# Patient Record
Sex: FEMALE | Race: WHITE | Employment: PART TIME | URBAN - METROPOLITAN AREA
[De-identification: names, ages, dates, MRNs, and addresses within clinical notes are randomized per-mention and may not be internally consistent; named-entity substitution may affect disease eponyms.]

---

## 2017-01-24 ENCOUNTER — LAB ENCOUNTER (OUTPATIENT)
Dept: LAB | Age: 42
End: 2017-01-24
Attending: INTERNAL MEDICINE
Payer: COMMERCIAL

## 2017-01-24 DIAGNOSIS — E55.9 VITAMIN D DEFICIENCY: ICD-10-CM

## 2017-01-24 DIAGNOSIS — E78.2 MIXED HYPERLIPIDEMIA: ICD-10-CM

## 2017-01-24 DIAGNOSIS — E53.8 LOW VITAMIN B12 LEVEL: ICD-10-CM

## 2017-01-24 DIAGNOSIS — R79.89 ABNORMAL THYROID BLOOD TEST: ICD-10-CM

## 2017-01-24 DIAGNOSIS — Z86.39 HISTORY OF IRON DEFICIENCY: ICD-10-CM

## 2017-01-24 LAB
25-HYDROXYVITAMIN D (TOTAL): 18.4 NG/ML (ref 30–100)
ALBUMIN SERPL-MCNC: 4 G/DL (ref 3.5–4.8)
ALP LIVER SERPL-CCNC: 54 U/L (ref 37–98)
ALT SERPL-CCNC: 26 U/L (ref 14–54)
AST SERPL-CCNC: 11 U/L (ref 15–41)
BASOPHILS # BLD AUTO: 0.02 X10(3) UL (ref 0–0.1)
BASOPHILS NFR BLD AUTO: 0.4 %
BILIRUB SERPL-MCNC: 0.5 MG/DL (ref 0.1–2)
BUN BLD-MCNC: 12 MG/DL (ref 8–20)
CALCIUM BLD-MCNC: 9.3 MG/DL (ref 8.3–10.3)
CHLORIDE: 105 MMOL/L (ref 101–111)
CHOLEST SMN-MCNC: 216 MG/DL (ref ?–200)
CO2: 29 MMOL/L (ref 22–32)
CREAT BLD-MCNC: 0.76 MG/DL (ref 0.55–1.02)
EOSINOPHIL # BLD AUTO: 0.07 X10(3) UL (ref 0–0.3)
EOSINOPHIL NFR BLD AUTO: 1.4 %
ERYTHROCYTE [DISTWIDTH] IN BLOOD BY AUTOMATED COUNT: 13 % (ref 11.5–16)
GLUCOSE BLD-MCNC: 86 MG/DL (ref 70–99)
HAV AB SERPL IA-ACNC: 358 PG/ML (ref 193–986)
HCT VFR BLD AUTO: 40.4 % (ref 34–50)
HDLC SERPL-MCNC: 75 MG/DL (ref 45–?)
HDLC SERPL: 2.88 {RATIO} (ref ?–4.44)
HGB BLD-MCNC: 13.1 G/DL (ref 12–16)
IMMATURE GRANULOCYTE COUNT: 0.01 X10(3) UL (ref 0–1)
IMMATURE GRANULOCYTE RATIO %: 0.2 %
LDLC SERPL CALC-MCNC: 126 MG/DL (ref ?–130)
LYMPHOCYTES # BLD AUTO: 1.3 X10(3) UL (ref 0.9–4)
LYMPHOCYTES NFR BLD AUTO: 26 %
M PROTEIN MFR SERPL ELPH: 7.2 G/DL (ref 6.1–8.3)
MCH RBC QN AUTO: 28.9 PG (ref 27–33.2)
MCHC RBC AUTO-ENTMCNC: 32.4 G/DL (ref 31–37)
MCV RBC AUTO: 89 FL (ref 81–100)
MONOCYTES # BLD AUTO: 0.33 X10(3) UL (ref 0.1–0.6)
MONOCYTES NFR BLD AUTO: 6.6 %
NEUTROPHIL ABS PRELIM: 3.27 X10 (3) UL (ref 1.3–6.7)
NEUTROPHILS # BLD AUTO: 3.27 X10(3) UL (ref 1.3–6.7)
NEUTROPHILS NFR BLD AUTO: 65.4 %
NONHDLC SERPL-MCNC: 141 MG/DL (ref ?–130)
PLATELET # BLD AUTO: 245 10(3)UL (ref 150–450)
POTASSIUM SERPL-SCNC: 4.1 MMOL/L (ref 3.6–5.1)
RBC # BLD AUTO: 4.54 X10(6)UL (ref 3.8–5.1)
RED CELL DISTRIBUTION WIDTH-SD: 42.2 FL (ref 35.1–46.3)
SODIUM SERPL-SCNC: 139 MMOL/L (ref 136–144)
TRIGLYCERIDES: 75 MG/DL (ref ?–150)
TSI SER-ACNC: 1.36 MIU/ML (ref 0.35–5.5)
VLDL: 15 MG/DL (ref 5–40)
WBC # BLD AUTO: 5 X10(3) UL (ref 4–13)

## 2017-01-24 PROCEDURE — 80061 LIPID PANEL: CPT

## 2017-01-24 PROCEDURE — 85025 COMPLETE CBC W/AUTO DIFF WBC: CPT

## 2017-01-24 PROCEDURE — 80053 COMPREHEN METABOLIC PANEL: CPT

## 2017-01-24 PROCEDURE — 84443 ASSAY THYROID STIM HORMONE: CPT

## 2017-01-24 PROCEDURE — 82306 VITAMIN D 25 HYDROXY: CPT

## 2017-01-24 PROCEDURE — 36415 COLL VENOUS BLD VENIPUNCTURE: CPT

## 2017-01-24 PROCEDURE — 82607 VITAMIN B-12: CPT

## 2017-02-01 ENCOUNTER — PATIENT MESSAGE (OUTPATIENT)
Dept: FAMILY MEDICINE CLINIC | Facility: CLINIC | Age: 42
End: 2017-02-01

## 2017-02-01 NOTE — TELEPHONE ENCOUNTER
Patient is able to see results drawn 1/24/17 on [facility] and is asking for vitamin D replacement. Results have not been reviewed yet, would you be able to review and advise when able?  Thank you!!!

## 2017-02-01 NOTE — TELEPHONE ENCOUNTER
From: Thor Frost  To: Radha Dominguez MD  Sent: 2/1/2017 2:29 PM CST  Subject: Prescription Question    Will Dr call me in vitamin D? Dixon at 60/80.   Thanks

## 2017-02-02 RX ORDER — ERGOCALCIFEROL 1.25 MG/1
50000 CAPSULE ORAL WEEKLY
Qty: 12 CAPSULE | Refills: 0 | Status: SHIPPED | OUTPATIENT
Start: 2017-02-02 | End: 2017-04-21

## 2017-02-02 NOTE — TELEPHONE ENCOUNTER
OK for usual vitamin D protocol   Low vitamin D level  Start 50,000 U q week x 12 weeks  Then start daily maintenance vitamin D 2000 Units

## 2017-02-20 ENCOUNTER — TELEPHONE (OUTPATIENT)
Dept: FAMILY MEDICINE CLINIC | Facility: CLINIC | Age: 42
End: 2017-02-20

## 2017-02-20 NOTE — TELEPHONE ENCOUNTER
Patient returned call about her lab results. She did pickup Vitamin D - it was sent to her pharmacy. She will get B12 shots, but wants to call back to schedule them.

## 2017-03-01 RX ORDER — CYANOCOBALAMIN 1000 UG/ML
1000 INJECTION INTRAMUSCULAR; SUBCUTANEOUS
Status: SHIPPED | OUTPATIENT
Start: 2017-03-01 | End: 2017-08-28

## 2017-03-01 NOTE — PROGRESS NOTES
Notes Recorded by Sukh Blanco MD on 2/5/2017 at 12:24 PM  tsh normal  Low b12, recommend starting b12 injections x 6 mo  cmp stable   Lipid panel stable  Low vitamin D level  Start 50,000 U q week x 12 weeks  Then start daily maintenance vitamin D 2000 Uni

## 2017-03-02 ENCOUNTER — NURSE ONLY (OUTPATIENT)
Dept: FAMILY MEDICINE CLINIC | Facility: CLINIC | Age: 42
End: 2017-03-02

## 2017-03-06 ENCOUNTER — NURSE ONLY (OUTPATIENT)
Dept: FAMILY MEDICINE CLINIC | Facility: CLINIC | Age: 42
End: 2017-03-06

## 2017-03-06 DIAGNOSIS — E53.8 LOW VITAMIN B12 LEVEL: Primary | ICD-10-CM

## 2017-03-06 PROCEDURE — 96372 THER/PROPH/DIAG INJ SC/IM: CPT | Performed by: INTERNAL MEDICINE

## 2017-03-06 RX ADMIN — CYANOCOBALAMIN 1000 MCG: 1000 INJECTION INTRAMUSCULAR; SUBCUTANEOUS at 14:06:00

## 2017-03-06 NOTE — PROGRESS NOTES
Notes Recorded by Namrata Romero MD on 2/5/2017 at 12:24 PM  tsh normal  Low b12, recommend starting b12 injections x 6 mo  cmp stable   Lipid panel stable  Low vitamin D level  Start 50,000 U q week x 12 weeks  Then start daily maintenance vitamin D 2000 Uni

## 2017-05-16 ENCOUNTER — NURSE ONLY (OUTPATIENT)
Dept: FAMILY MEDICINE CLINIC | Facility: CLINIC | Age: 42
End: 2017-05-16

## 2017-05-16 DIAGNOSIS — E53.8 LOW VITAMIN B12 LEVEL: Primary | ICD-10-CM

## 2017-05-16 PROCEDURE — 96372 THER/PROPH/DIAG INJ SC/IM: CPT | Performed by: INTERNAL MEDICINE

## 2017-05-16 RX ADMIN — CYANOCOBALAMIN 1000 MCG: 1000 INJECTION INTRAMUSCULAR; SUBCUTANEOUS at 13:33:00

## 2017-05-16 NOTE — PROGRESS NOTES
Patient is here for B12 injection. Given IM left deltoid. Patient tolerated well. All questions answered.

## 2017-05-25 ENCOUNTER — HOSPITAL ENCOUNTER (OUTPATIENT)
Dept: MAMMOGRAPHY | Age: 42
Discharge: HOME | End: 2017-05-25
Attending: INTERNAL MEDICINE
Payer: COMMERCIAL

## 2017-05-25 DIAGNOSIS — Z12.31 VISIT FOR SCREENING MAMMOGRAM: ICD-10-CM

## 2017-05-25 PROCEDURE — 77067 SCR MAMMO BI INCL CAD: CPT | Performed by: INTERNAL MEDICINE

## 2017-05-26 DIAGNOSIS — Z12.31 ENCOUNTER FOR SCREENING MAMMOGRAM FOR BREAST CANCER: Primary | ICD-10-CM

## 2017-09-01 ENCOUNTER — MED REC SCAN ONLY (OUTPATIENT)
Dept: FAMILY MEDICINE CLINIC | Facility: CLINIC | Age: 42
End: 2017-09-01

## 2017-09-19 ENCOUNTER — NURSE ONLY (OUTPATIENT)
Dept: FAMILY MEDICINE CLINIC | Facility: CLINIC | Age: 42
End: 2017-09-19

## 2017-09-19 DIAGNOSIS — Z23 NEED FOR INFLUENZA VACCINATION: Primary | ICD-10-CM

## 2017-09-19 PROCEDURE — 96372 THER/PROPH/DIAG INJ SC/IM: CPT | Performed by: INTERNAL MEDICINE

## 2017-09-19 PROCEDURE — 90471 IMMUNIZATION ADMIN: CPT | Performed by: INTERNAL MEDICINE

## 2017-09-19 PROCEDURE — 90686 IIV4 VACC NO PRSV 0.5 ML IM: CPT | Performed by: INTERNAL MEDICINE

## 2017-09-19 RX ORDER — CYANOCOBALAMIN 1000 UG/ML
1000 INJECTION INTRAMUSCULAR; SUBCUTANEOUS
Status: SHIPPED | OUTPATIENT
Start: 2017-09-19 | End: 2018-01-17

## 2017-09-19 RX ADMIN — CYANOCOBALAMIN 1000 MCG: 1000 INJECTION INTRAMUSCULAR; SUBCUTANEOUS at 10:16:00

## 2017-09-19 NOTE — PROGRESS NOTES
Patient is here for vitamin B12 injection #3 and Flu vaccine. Flu given IM to left deltoid and B12 given IM to right deltoid - both without incident.

## 2017-09-19 NOTE — PROGRESS NOTES
Notes Recorded by Thelma Panda MD on 2/5/2017 at 12:24 PM  tsh normal  Low b12, recommend starting b12 injections x 6 mo  cmp stable   Lipid panel stable  Low vitamin D level  Start 50,000 U q week x 12 weeks  Then start daily maintenance vitamin D 2000 Uni

## 2017-09-25 ENCOUNTER — OFFICE VISIT (OUTPATIENT)
Dept: FAMILY MEDICINE CLINIC | Facility: CLINIC | Age: 42
End: 2017-09-25

## 2017-09-25 ENCOUNTER — APPOINTMENT (OUTPATIENT)
Dept: LAB | Age: 42
End: 2017-09-25
Attending: INTERNAL MEDICINE
Payer: COMMERCIAL

## 2017-09-25 VITALS
HEART RATE: 88 BPM | DIASTOLIC BLOOD PRESSURE: 78 MMHG | WEIGHT: 239 LBS | BODY MASS INDEX: 36.22 KG/M2 | HEIGHT: 68 IN | TEMPERATURE: 99 F | RESPIRATION RATE: 16 BRPM | SYSTOLIC BLOOD PRESSURE: 138 MMHG

## 2017-09-25 DIAGNOSIS — R00.2 HEART PALPITATIONS: Primary | ICD-10-CM

## 2017-09-25 DIAGNOSIS — H81.13 BENIGN PAROXYSMAL POSITIONAL VERTIGO DUE TO BILATERAL VESTIBULAR DISORDER: ICD-10-CM

## 2017-09-25 DIAGNOSIS — R79.89 LOW T4: ICD-10-CM

## 2017-09-25 LAB
FREE T4: 1 NG/DL (ref 0.9–1.8)
TSI SER-ACNC: 1.4 MIU/ML (ref 0.35–5.5)

## 2017-09-25 PROCEDURE — 84439 ASSAY OF FREE THYROXINE: CPT

## 2017-09-25 PROCEDURE — 84443 ASSAY THYROID STIM HORMONE: CPT

## 2017-09-25 PROCEDURE — 99214 OFFICE O/P EST MOD 30 MIN: CPT | Performed by: INTERNAL MEDICINE

## 2017-09-25 PROCEDURE — 36415 COLL VENOUS BLD VENIPUNCTURE: CPT

## 2017-09-25 PROCEDURE — 93000 ELECTROCARDIOGRAM COMPLETE: CPT | Performed by: INTERNAL MEDICINE

## 2017-09-25 RX ORDER — MECLIZINE HCL 12.5 MG/1
12.5 TABLET ORAL 3 TIMES DAILY PRN
Qty: 20 TABLET | Refills: 0 | Status: SHIPPED | OUTPATIENT
Start: 2017-09-25 | End: 2018-01-10 | Stop reason: ALTCHOICE

## 2017-09-25 NOTE — PROGRESS NOTES
CC: Patient presents with:  Dizziness: x 2-3 weeks  Palpitations: x 2 weeks       HPI:     Diagnosed with BPPV a few years ago.    3 weeks ago feels like BPPV is back   Got her hair done and that was difficult with moving back and fwd  BP was 140/90 and 20 tablet 0      Sig: Take 1 tablet (12.5 mg total) by mouth 3 (three) times daily as needed.           ELECTROCARDIOGRAM, COMPLETE  OP REFERRAL TO [facility]  CARD MONITOR HOLTER 48 HOUR (CPT=66704)  CARD ECHO STRESS ECHO/REST AND STRE

## 2017-09-28 ENCOUNTER — HOSPITAL ENCOUNTER (OUTPATIENT)
Dept: CV DIAGNOSTICS | Facility: HOSPITAL | Age: 42
Discharge: HOME | End: 2017-09-28
Attending: INTERNAL MEDICINE
Payer: COMMERCIAL

## 2017-09-28 DIAGNOSIS — R00.2 HEART PALPITATIONS: ICD-10-CM

## 2017-09-28 PROCEDURE — 93227 XTRNL ECG REC<48 HR R&I: CPT | Performed by: INTERNAL MEDICINE

## 2017-09-28 PROCEDURE — 93226 XTRNL ECG REC<48 HR SCAN A/R: CPT | Performed by: INTERNAL MEDICINE

## 2017-09-28 PROCEDURE — 93225 XTRNL ECG REC<48 HRS REC: CPT | Performed by: INTERNAL MEDICINE

## 2017-10-02 DIAGNOSIS — I49.3 PVC (PREMATURE VENTRICULAR CONTRACTION): Primary | ICD-10-CM

## 2017-10-02 DIAGNOSIS — I49.1 PAC (PREMATURE ATRIAL CONTRACTION): ICD-10-CM

## 2017-10-04 ENCOUNTER — TELEPHONE (OUTPATIENT)
Dept: FAMILY MEDICINE CLINIC | Facility: CLINIC | Age: 42
End: 2017-10-04

## 2017-10-04 DIAGNOSIS — I49.3 PVC (PREMATURE VENTRICULAR CONTRACTION): Primary | ICD-10-CM

## 2017-10-04 NOTE — TELEPHONE ENCOUNTER
Sonia Santana - Peer to Peer << Less Detail',event)\" href=\"javascript:;\">More Detail >>      Peer to Peer   Sonia Santana   Sent: Wed October 04, 2017  8:34 AM   To: P [facility] Clinical Staff                  Message     Good Morning,      Per [facility] -----  From: Ester Alonso  Sent: 10/4/2017   8:34 AM  To: [facility] Clinical Staff  Subject: Peer to Peer                                     Good Morning,    Per [facility] this test does not meet medical necessity and has been denied.  Per their guidelines the

## 2017-10-05 PROBLEM — I49.3 PVC (PREMATURE VENTRICULAR CONTRACTION): Status: ACTIVE | Noted: 2017-10-05

## 2017-10-10 NOTE — TELEPHONE ENCOUNTER
Order   CARD ECHO STRESS ECHO/REST AND STRESS (CPT=93351) (Order # [id number]) on 09/25/2017   View Encounter        This test will not be covered. A traditional stress test on a treadmill will be covered. Do you want to change order?     Patient saw car

## 2017-10-10 NOTE — TELEPHONE ENCOUNTER
Order placed for plain treadmill stress test.  I called patient to inform , lm for her to contact scheduling to reschedule plain treadmill test since stress echo is not approved.

## 2017-10-31 ENCOUNTER — HOSPITAL ENCOUNTER (OUTPATIENT)
Dept: CV DIAGNOSTICS | Age: 42
Discharge: HOME | End: 2017-10-31
Attending: INTERNAL MEDICINE
Payer: COMMERCIAL

## 2017-10-31 DIAGNOSIS — I49.3 PVC (PREMATURE VENTRICULAR CONTRACTION): ICD-10-CM

## 2017-10-31 PROCEDURE — 93018 CV STRESS TEST I&R ONLY: CPT | Performed by: INTERNAL MEDICINE

## 2017-10-31 PROCEDURE — 93017 CV STRESS TEST TRACING ONLY: CPT | Performed by: INTERNAL MEDICINE

## 2017-11-02 NOTE — PROGRESS NOTES
Spoke with cardiology at [facility] .They will fax tracings to the [facility] office .

## 2017-11-15 NOTE — TELEPHONE ENCOUNTER
Scheduling called us to discuss Stress Echo ordered - patient is scheduled, but it fails coverage and they want to know if we are going to do peer to peer.    On 10/4/17 Dr. Mary Alice Barry was informed it was not covered and she had us order Treadmill Stress Test and

## 2017-11-16 NOTE — TELEPHONE ENCOUNTER
Patient called back and said that when she saw Dr. Tanesha Melo - he wanted her to proceed with stress echo.   I informed her to call his office to have order placed in his name as he will have to justify with insurance why he wants this test. Patient will melany

## 2017-11-20 ENCOUNTER — HOSPITAL ENCOUNTER (OUTPATIENT)
Dept: CV DIAGNOSTICS | Facility: HOSPITAL | Age: 42
Discharge: HOME | End: 2017-11-20
Attending: INTERNAL MEDICINE
Payer: COMMERCIAL

## 2017-11-20 DIAGNOSIS — R00.2 HEART PALPITATIONS: ICD-10-CM

## 2017-11-20 DIAGNOSIS — R94.39 ABNORMAL STRESS TEST: ICD-10-CM

## 2017-11-20 PROCEDURE — 93017 CV STRESS TEST TRACING ONLY: CPT | Performed by: INTERNAL MEDICINE

## 2017-11-20 PROCEDURE — 93018 CV STRESS TEST I&R ONLY: CPT | Performed by: INTERNAL MEDICINE

## 2017-11-20 PROCEDURE — 93350 STRESS TTE ONLY: CPT | Performed by: INTERNAL MEDICINE

## 2017-11-20 NOTE — IMAGING NOTE
Patient had multiple pacs and pvcs on SE and one 4 beat run of ventricular tachycardia. Patient remained asymptomatic.  Emiliano OGLESBY was notified and patient was ok to go home and told to follow up with her cardiologist.

## 2017-11-21 PROBLEM — I47.29 NON-SUSTAINED VENTRICULAR TACHYCARDIA (HCC): Status: ACTIVE | Noted: 2017-11-21

## 2017-11-21 PROBLEM — I47.2 NON-SUSTAINED VENTRICULAR TACHYCARDIA (HCC): Status: ACTIVE | Noted: 2017-11-21

## 2017-11-21 PROBLEM — I47.29 NON-SUSTAINED VENTRICULAR TACHYCARDIA: Status: ACTIVE | Noted: 2017-11-21

## 2017-11-21 RX ORDER — LEVOTHYROXINE SODIUM 0.05 MG/1
TABLET ORAL
Qty: 30 TABLET | Refills: 5 | Status: SHIPPED | OUTPATIENT
Start: 2017-11-21 | End: 2018-03-19

## 2017-11-21 NOTE — TELEPHONE ENCOUNTER
Requesting Levothyroxine  LOV: 9/25/1  RTC: 4 weeks  Last Relevant Labs: 9/25/17  Filled: 11/7/16 #90 with 3 refills  PP - refilled  Future Appointments  Date Time Provider Alesia Herrera   11/27/2017 1:30 PM [facility] NURSE [facility] [address]       Time:

## 2017-11-21 NOTE — PROGRESS NOTES
Spoke with patient ,results given. Order sent to prior [facility] for event monitor .

## 2017-11-27 ENCOUNTER — NURSE ONLY (OUTPATIENT)
Dept: FAMILY MEDICINE CLINIC | Facility: CLINIC | Age: 42
End: 2017-11-27

## 2017-11-27 PROCEDURE — 96372 THER/PROPH/DIAG INJ SC/IM: CPT | Performed by: INTERNAL MEDICINE

## 2017-11-27 RX ADMIN — CYANOCOBALAMIN 1000 MCG: 1000 INJECTION INTRAMUSCULAR; SUBCUTANEOUS at 13:42:00

## 2017-11-27 NOTE — PROGRESS NOTES
LM for patient to call back. She should not be getting B12 injections still?      Notes Recorded by Thalia Jain MD on 2/5/2017 at 12:24 PM  tsh normal  Low b12, recommend starting b12 injections x 6 mo    No lab since January 2017    Ref Range & Units 1/24

## 2017-11-28 ENCOUNTER — HOSPITAL ENCOUNTER (OUTPATIENT)
Dept: CV DIAGNOSTICS | Age: 42
Discharge: HOME | End: 2017-11-28
Attending: INTERNAL MEDICINE
Payer: COMMERCIAL

## 2017-11-28 DIAGNOSIS — I47.2 NON-SUSTAINED VENTRICULAR TACHYCARDIA (HCC): ICD-10-CM

## 2017-11-28 DIAGNOSIS — I49.3 PVC (PREMATURE VENTRICULAR CONTRACTION): ICD-10-CM

## 2017-11-28 PROCEDURE — 93272 ECG/REVIEW INTERPRET ONLY: CPT | Performed by: INTERNAL MEDICINE

## 2017-11-28 PROCEDURE — 93270 REMOTE 30 DAY ECG REV/REPORT: CPT | Performed by: INTERNAL MEDICINE

## 2017-11-28 PROCEDURE — 93271 ECG/MONITORING AND ANALYSIS: CPT | Performed by: INTERNAL MEDICINE

## 2017-12-11 ENCOUNTER — PATIENT MESSAGE (OUTPATIENT)
Dept: INTERNAL MEDICINE CLINIC | Facility: CLINIC | Age: 42
End: 2017-12-11

## 2017-12-11 NOTE — TELEPHONE ENCOUNTER
From: Jovan Reed  To: Calleen Denver, MD  Sent: 12/11/2017 7:27 AM CST  Subject: Non-Urgent Medical Question    Can you ask Dr Nancy Irizarry to order me a blood draw? It’s been a year and I’m going thru my final test for my heart palpitations (I hope).  Then [employer]

## 2018-01-09 PROBLEM — R94.39 ABNORMAL STRESS TEST: Status: ACTIVE | Noted: 2018-01-09

## 2018-02-15 ENCOUNTER — OFFICE VISIT (OUTPATIENT)
Dept: OBGYN CLINIC | Facility: CLINIC | Age: 43
End: 2018-02-15

## 2018-02-15 VITALS
SYSTOLIC BLOOD PRESSURE: 110 MMHG | BODY MASS INDEX: 37.59 KG/M2 | HEIGHT: 68 IN | RESPIRATION RATE: 18 BRPM | HEART RATE: 88 BPM | DIASTOLIC BLOOD PRESSURE: 60 MMHG | WEIGHT: 248 LBS

## 2018-02-15 DIAGNOSIS — Z01.419 ENCOUNTER FOR WELL WOMAN EXAM WITH ROUTINE GYNECOLOGICAL EXAM: Primary | ICD-10-CM

## 2018-02-15 PROCEDURE — 87624 HPV HI-RISK TYP POOLED RSLT: CPT | Performed by: OBSTETRICS & GYNECOLOGY

## 2018-02-15 PROCEDURE — 99396 PREV VISIT EST AGE 40-64: CPT | Performed by: OBSTETRICS & GYNECOLOGY

## 2018-02-15 PROCEDURE — 88175 CYTOPATH C/V AUTO FLUID REDO: CPT | Performed by: OBSTETRICS & GYNECOLOGY

## 2018-02-15 NOTE — PROGRESS NOTES
Blair Burris is a 36year old female  Patient's last menstrual period was 2018 (exact date).  Patient presents with:  Wellness Visit: annual  .  Patient states that 2nd day of her period is heavy, declines any hormonal b.c., discussed Lysteda 5    ALLERGIES:  No Known Allergies      Review of Systems:  Constitutional:  Denies fatigue, night sweats, hot flashes  Eyes:  denies blurred or double vision  Cardiovascular:  denies chest pain or palpitations  Respiratory:  denies shortness of breath  G

## 2018-02-16 LAB — HPV I/H RISK 1 DNA SPEC QL NAA+PROBE: NEGATIVE

## 2018-03-19 ENCOUNTER — LAB ENCOUNTER (OUTPATIENT)
Dept: LAB | Age: 43
End: 2018-03-19
Attending: FAMILY MEDICINE
Payer: COMMERCIAL

## 2018-03-19 ENCOUNTER — OFFICE VISIT (OUTPATIENT)
Dept: FAMILY MEDICINE CLINIC | Facility: CLINIC | Age: 43
End: 2018-03-19

## 2018-03-19 VITALS
HEART RATE: 72 BPM | HEIGHT: 68 IN | SYSTOLIC BLOOD PRESSURE: 134 MMHG | WEIGHT: 250 LBS | RESPIRATION RATE: 16 BRPM | DIASTOLIC BLOOD PRESSURE: 94 MMHG | BODY MASS INDEX: 37.89 KG/M2 | TEMPERATURE: 98 F

## 2018-03-19 DIAGNOSIS — Z00.00 ANNUAL PHYSICAL EXAM: Primary | ICD-10-CM

## 2018-03-19 DIAGNOSIS — Z86.39 HISTORY OF IRON DEFICIENCY: ICD-10-CM

## 2018-03-19 DIAGNOSIS — E53.8 VITAMIN B12 DEFICIENCY: ICD-10-CM

## 2018-03-19 DIAGNOSIS — E03.9 ACQUIRED HYPOTHYROIDISM: ICD-10-CM

## 2018-03-19 DIAGNOSIS — E55.9 VITAMIN D DEFICIENCY: ICD-10-CM

## 2018-03-19 DIAGNOSIS — R94.39 ABNORMAL STRESS TEST: ICD-10-CM

## 2018-03-19 DIAGNOSIS — Z00.00 ANNUAL PHYSICAL EXAM: ICD-10-CM

## 2018-03-19 DIAGNOSIS — E66.09 CLASS 2 OBESITY DUE TO EXCESS CALORIES WITHOUT SERIOUS COMORBIDITY WITH BODY MASS INDEX (BMI) OF 38.0 TO 38.9 IN ADULT: ICD-10-CM

## 2018-03-19 PROCEDURE — 36415 COLL VENOUS BLD VENIPUNCTURE: CPT

## 2018-03-19 PROCEDURE — 82607 VITAMIN B-12: CPT

## 2018-03-19 PROCEDURE — 82306 VITAMIN D 25 HYDROXY: CPT

## 2018-03-19 PROCEDURE — 99213 OFFICE O/P EST LOW 20 MIN: CPT | Performed by: FAMILY MEDICINE

## 2018-03-19 PROCEDURE — 83550 IRON BINDING TEST: CPT

## 2018-03-19 PROCEDURE — 85025 COMPLETE CBC W/AUTO DIFF WBC: CPT

## 2018-03-19 PROCEDURE — 80050 GENERAL HEALTH PANEL: CPT

## 2018-03-19 PROCEDURE — 99396 PREV VISIT EST AGE 40-64: CPT | Performed by: FAMILY MEDICINE

## 2018-03-19 PROCEDURE — 80061 LIPID PANEL: CPT

## 2018-03-19 PROCEDURE — 82728 ASSAY OF FERRITIN: CPT

## 2018-03-19 PROCEDURE — 83735 ASSAY OF MAGNESIUM: CPT

## 2018-03-19 PROCEDURE — 83540 ASSAY OF IRON: CPT

## 2018-03-19 PROCEDURE — 80053 COMPREHEN METABOLIC PANEL: CPT

## 2018-03-19 RX ORDER — LEVOTHYROXINE SODIUM 0.05 MG/1
50 TABLET ORAL
Qty: 90 TABLET | Refills: 1 | Status: SHIPPED | OUTPATIENT
Start: 2018-03-19 | End: 2018-08-07

## 2018-03-19 NOTE — PROGRESS NOTES
Jovan Reed is a 36year old female that presents for annual physical exam.     History of hypothyroid well-controlled on levothyroxine.   She has history of palpitations evaluated last year by cardiology found to have abnormal stress test with PVCs an • Colon Cancer Neg        Social History Narrative  She is  with 3 children and works as a dental hygienist.  She is a non-smoker and drinks alcohol rarely.       REVIEW OF SYSTEMS:   GENERAL: feels well otherwise  SKIN: denies any unusual skin le CBC WITH DIFFERENTIAL WITH PLATELET; Future  - COMP METABOLIC PANEL (14); Future  - LIPID PANEL; Future    2. Acquired hypothyroidism  - TSH W REFLEX TO FREE T4; Future  - Levothyroxine Sodium 50 MCG Oral Tab;  Take 1 tablet (50 mcg total) by mouth before b

## 2018-03-19 NOTE — PROGRESS NOTES
HPI:   Haley Gates is a 36year old female that presents for ***    PVCs, follows with Dr. Yazmin Neville, f/u in July  Takes MVI 4 days per week, no on Vit D  No formal exericse, once per week.    Last tetannus, 2009 with pregnancy    Patient Active Proble pulses b/l. NEURO:  Grossly normal     ASSESSMENT AND PLAN:      1. Vitamin D deficiency  ***    2. Acquired hypothyroidism  ***        Risks, benefits, and alternatives of current treatment plan discussed in detail. Questions and concerns addressed.  Re

## 2018-03-19 NOTE — PATIENT INSTRUCTIONS
Thank you for allowing me to participate in your care today. I will contact you with any results from today's visit. Lab results are typically available in 2-3 days for blood tests, and 3-5 days for any cultures or Paps.    Please let me know if you hav older who are at risk for coronary artery disease; younger women, talk with your healthcare provider At least every 5 years   HIV All women At routine exams. Those with risk factors for HIV should be tested at least annually.    Obesity All women in this ag Tdap instead of a Td booster after age 25, then Td every 10 years   Counseling Who needs it How often   BRCA gene mutation testing for breast and ovarian cancer susceptibility Women with increased risk for having gene mutation When your risk is known   Fiona

## 2018-03-20 LAB
25-HYDROXYVITAMIN D (TOTAL): 10.4 NG/ML (ref 30–100)
ALBUMIN SERPL-MCNC: 4 G/DL (ref 3.5–4.8)
ALP LIVER SERPL-CCNC: 66 U/L (ref 37–98)
ALT SERPL-CCNC: 19 U/L (ref 14–54)
AST SERPL-CCNC: 18 U/L (ref 15–41)
BASOPHILS # BLD AUTO: 0.02 X10(3) UL (ref 0–0.1)
BASOPHILS NFR BLD AUTO: 0.3 %
BILIRUB SERPL-MCNC: 0.4 MG/DL (ref 0.1–2)
BUN BLD-MCNC: 11 MG/DL (ref 8–20)
CALCIUM BLD-MCNC: 8.9 MG/DL (ref 8.3–10.3)
CHLORIDE: 102 MMOL/L (ref 101–111)
CHOLEST SMN-MCNC: 211 MG/DL (ref ?–200)
CO2: 27 MMOL/L (ref 22–32)
CREAT BLD-MCNC: 0.73 MG/DL (ref 0.55–1.02)
DEPRECATED HBV CORE AB SER IA-ACNC: 6.6 NG/ML (ref 10–291)
EOSINOPHIL # BLD AUTO: 0.05 X10(3) UL (ref 0–0.3)
EOSINOPHIL NFR BLD AUTO: 0.8 %
ERYTHROCYTE [DISTWIDTH] IN BLOOD BY AUTOMATED COUNT: 14.6 % (ref 11.5–16)
GLUCOSE BLD-MCNC: 73 MG/DL (ref 70–99)
HAV AB SERPL IA-ACNC: 345 PG/ML (ref 193–986)
HAV IGM SER QL: 2.1 MG/DL (ref 1.7–3)
HCT VFR BLD AUTO: 41.6 % (ref 34–50)
HDLC SERPL-MCNC: 70 MG/DL (ref 45–?)
HDLC SERPL: 3.01 {RATIO} (ref ?–4.44)
HGB BLD-MCNC: 12.9 G/DL (ref 12–16)
IMMATURE GRANULOCYTE COUNT: 0.02 X10(3) UL (ref 0–1)
IMMATURE GRANULOCYTE RATIO %: 0.3 %
IRON SATURATION: 13 % (ref 13–45)
IRON: 61 UG/DL (ref 28–170)
LDLC SERPL CALC-MCNC: 129 MG/DL (ref ?–130)
LYMPHOCYTES # BLD AUTO: 1.5 X10(3) UL (ref 0.9–4)
LYMPHOCYTES NFR BLD AUTO: 24.8 %
M PROTEIN MFR SERPL ELPH: 7.5 G/DL (ref 6.1–8.3)
MCH RBC QN AUTO: 27.7 PG (ref 27–33.2)
MCHC RBC AUTO-ENTMCNC: 31 G/DL (ref 31–37)
MCV RBC AUTO: 89.5 FL (ref 81–100)
MONOCYTES # BLD AUTO: 0.38 X10(3) UL (ref 0.1–1)
MONOCYTES NFR BLD AUTO: 6.3 %
NEUTROPHIL ABS PRELIM: 4.07 X10 (3) UL (ref 1.3–6.7)
NEUTROPHILS # BLD AUTO: 4.07 X10(3) UL (ref 1.3–6.7)
NEUTROPHILS NFR BLD AUTO: 67.5 %
NONHDLC SERPL-MCNC: 141 MG/DL (ref ?–130)
PLATELET # BLD AUTO: 282 10(3)UL (ref 150–450)
POTASSIUM SERPL-SCNC: 3.9 MMOL/L (ref 3.6–5.1)
RBC # BLD AUTO: 4.65 X10(6)UL (ref 3.8–5.1)
RED CELL DISTRIBUTION WIDTH-SD: 47.5 FL (ref 35.1–46.3)
SODIUM SERPL-SCNC: 137 MMOL/L (ref 136–144)
TOTAL IRON BINDING CAPACITY: 474 UG/DL (ref 298–536)
TRANSFERRIN: 318 MG/DL (ref 200–360)
TRIGL SERPL-MCNC: 61 MG/DL (ref ?–150)
TSI SER-ACNC: 1.73 MIU/ML (ref 0.35–5.5)
VLDLC SERPL CALC-MCNC: 12 MG/DL (ref 5–40)
WBC # BLD AUTO: 6 X10(3) UL (ref 4–13)

## 2018-03-21 PROBLEM — E61.1 DIETARY IRON DEFICIENCY WITHOUT ANEMIA: Status: ACTIVE | Noted: 2018-03-21

## 2018-03-22 ENCOUNTER — PATIENT MESSAGE (OUTPATIENT)
Dept: FAMILY MEDICINE CLINIC | Facility: CLINIC | Age: 43
End: 2018-03-22

## 2018-03-22 DIAGNOSIS — E61.1 LOW SERUM IRON: Primary | ICD-10-CM

## 2018-03-22 DIAGNOSIS — E55.9 VITAMIN D DEFICIENCY: ICD-10-CM

## 2018-03-22 RX ORDER — ERGOCALCIFEROL 1.25 MG/1
50000 CAPSULE ORAL WEEKLY
Qty: 12 CAPSULE | Refills: 0 | Status: SHIPPED | OUTPATIENT
Start: 2018-03-22 | End: 2018-06-20

## 2018-03-22 NOTE — TELEPHONE ENCOUNTER
From: Ganesh Santos  To: Nathalia Malik DO  Sent: 3/22/2018 9:35 AM CDT  Subject: Non-Urgent Medical Question    What does it mean that my RDW-SD is high?

## 2018-08-07 DIAGNOSIS — Z12.39 SCREENING FOR MALIGNANT NEOPLASM OF BREAST: Primary | ICD-10-CM

## 2018-08-07 DIAGNOSIS — E03.9 ACQUIRED HYPOTHYROIDISM: ICD-10-CM

## 2018-08-07 RX ORDER — LEVOTHYROXINE SODIUM 0.05 MG/1
50 TABLET ORAL
Qty: 30 TABLET | Refills: 0 | Status: SHIPPED | OUTPATIENT
Start: 2018-08-07 | End: 2018-08-07

## 2018-08-07 RX ORDER — LEVOTHYROXINE SODIUM 0.05 MG/1
50 TABLET ORAL
Qty: 90 TABLET | Refills: 0 | Status: SHIPPED | OUTPATIENT
Start: 2018-08-07 | End: 2018-08-07

## 2018-08-07 RX ORDER — LEVOTHYROXINE SODIUM 0.05 MG/1
50 TABLET ORAL
Qty: 90 TABLET | Refills: 0 | Status: SHIPPED | OUTPATIENT
Start: 2018-08-07 | End: 2019-02-03

## 2018-08-07 NOTE — TELEPHONE ENCOUNTER
Patient states she has new insurance and a new drug plan.  She is almost out of Levothyroxine and is going to get a 30 day supply from [facility]. She also wants to get a new RX 90 day supply started now for 9/1/18 for Levothyroxin sent to the new [facility]

## 2018-08-07 NOTE — TELEPHONE ENCOUNTER
Thyroid Supplements Protocol Passed  Requesting Levothyroxine 90 day to [facility]/ 30 day to local pharmacy   LOV: 3/19/18  RTC: 6 mos  Last Labs: 3/19/18 tsh  Filled: 3/19/18#90 with 1 refills  Mammogram order placed  Approved PP  Future Appointment

## 2018-08-21 PROBLEM — R53.82 CHRONIC FATIGUE: Status: ACTIVE | Noted: 2018-08-21

## 2018-09-05 NOTE — PROGRESS NOTES
HISTORY OF PRESENT ILLNESS  Patient presents with:  Weight Problem: former Dr. Ashely Mendez pt as PCP      Ariel Escalante is a 36year old female new to our office today for initiation of medical weight loss program.  Patient presents today with c/o excess weight sin negative  DVT: negative   Family or personal history of Pancreatic issues / Medullary Thyroid Cancer: negative   History of bariatric surgery: negative    [address] reviewed: obesity in parent/s or sibling: none    REVIEW OF SYSTEMS  GENERAL: feels well otherwis GFRAA 117 03/19/2018   CA 8.9 03/19/2018   ALKPHO 66 03/19/2018   AST 18 03/19/2018   ALT 19 03/19/2018   BILT 0.4 03/19/2018   TP 7.5 03/19/2018   ALB 4.0 03/19/2018    03/19/2018   K 3.9 03/19/2018    03/19/2018   CO2 27.0 03/19/2018     No Insulin Pen Needle (BD PEN NEEDLE ZAAR U/F) 32G X 4 MM Does not apply Misc; Inject 1 pen into the skin daily.  -     OP REFERRAL TO DIAGNOSTIC SLEEP STUDY  -     OP REFERRAL TO DIETITIAN [facility] ([facility]LC USE ONLY)    Vitamin D deficiency  - hx in EMR- off supp take as discussed and prescribed:  Start Saxenda daily as directed:  Week 1- .6mg daily  Week 2- 1.2mg daily  Week 3- 1.8mg daily  Week 4- 2.4mg daily  Week 5- 3mg daily  4. Sleep study order placed at [facility].       Please try to work on the foll

## 2018-09-06 ENCOUNTER — APPOINTMENT (OUTPATIENT)
Dept: LAB | Age: 43
End: 2018-09-06
Attending: NURSE PRACTITIONER
Payer: COMMERCIAL

## 2018-09-06 ENCOUNTER — OFFICE VISIT (OUTPATIENT)
Dept: INTERNAL MEDICINE CLINIC | Facility: CLINIC | Age: 43
End: 2018-09-06
Payer: COMMERCIAL

## 2018-09-06 VITALS
HEART RATE: 82 BPM | OXYGEN SATURATION: 99 % | HEIGHT: 68 IN | WEIGHT: 253 LBS | SYSTOLIC BLOOD PRESSURE: 146 MMHG | BODY MASS INDEX: 38.34 KG/M2 | DIASTOLIC BLOOD PRESSURE: 86 MMHG

## 2018-09-06 DIAGNOSIS — I49.3 PVC (PREMATURE VENTRICULAR CONTRACTION): ICD-10-CM

## 2018-09-06 DIAGNOSIS — E55.9 VITAMIN D DEFICIENCY: ICD-10-CM

## 2018-09-06 DIAGNOSIS — E53.8 LOW VITAMIN B12 LEVEL: ICD-10-CM

## 2018-09-06 DIAGNOSIS — E66.01 SEVERE OBESITY (HCC): ICD-10-CM

## 2018-09-06 DIAGNOSIS — E61.1 LOW SERUM IRON: ICD-10-CM

## 2018-09-06 DIAGNOSIS — R53.82 CHRONIC FATIGUE: ICD-10-CM

## 2018-09-06 DIAGNOSIS — Z51.81 THERAPEUTIC DRUG MONITORING: Primary | ICD-10-CM

## 2018-09-06 LAB
DEPRECATED HBV CORE AB SER IA-ACNC: 5.7 NG/ML (ref 12–240)
EST. AVERAGE GLUCOSE BLD GHB EST-MCNC: 120 MG/DL (ref 68–126)
HAV AB SERPL IA-ACNC: 261 PG/ML (ref 193–986)
HBA1C MFR BLD HPLC: 5.8 % (ref ?–5.7)
VIT D+METAB SERPL-MCNC: 20.6 NG/ML (ref 30–100)

## 2018-09-06 PROCEDURE — 99203 OFFICE O/P NEW LOW 30 MIN: CPT | Performed by: NURSE PRACTITIONER

## 2018-09-06 PROCEDURE — 82397 CHEMILUMINESCENT ASSAY: CPT | Performed by: NURSE PRACTITIONER

## 2018-09-06 PROCEDURE — 83036 HEMOGLOBIN GLYCOSYLATED A1C: CPT | Performed by: NURSE PRACTITIONER

## 2018-09-06 PROCEDURE — 82607 VITAMIN B-12: CPT | Performed by: NURSE PRACTITIONER

## 2018-09-06 PROCEDURE — 82306 VITAMIN D 25 HYDROXY: CPT | Performed by: FAMILY MEDICINE

## 2018-09-06 PROCEDURE — 82728 ASSAY OF FERRITIN: CPT | Performed by: FAMILY MEDICINE

## 2018-09-06 RX ORDER — LIRAGLUTIDE 6 MG/ML
3 INJECTION, SOLUTION SUBCUTANEOUS DAILY
Qty: 5 PEN | Refills: 1 | Status: SHIPPED | OUTPATIENT
Start: 2018-09-06 | End: 2018-10-01

## 2018-09-06 RX ORDER — CHOLECALCIFEROL (VITAMIN D3) 50 MCG
CAPSULE ORAL
COMMUNITY
End: 2022-01-31 | Stop reason: ALTCHOICE

## 2018-09-06 NOTE — PATIENT INSTRUCTIONS
Welcome to the [facility]. ..your Lifestyle Renovation begins now! Thank you for placing your trust in our health care team, I look forward to working with you along this journey to better health!     Next steps:     1.  Schedule a personal n help with stress, but meditation using the CALM Kevin or another comparable alternative can be done in your home or place of work with little time commitment. Check out [email or web address] blog for continued daily support and education along this we

## 2018-09-06 NOTE — PROGRESS NOTES
Unable to verify Saxenda coverage on website, we did not have prescription coverage information         Patient teaching on [address] [facility] performed. Patient aware of the directions of how to titrate the dose on this medication.    Start therapy: Week 1- 0.6mg ryen

## 2018-09-07 PROBLEM — R79.0 LOW FERRITIN: Status: ACTIVE | Noted: 2018-09-07

## 2018-09-07 PROBLEM — R73.03 PREDIABETES: Status: ACTIVE | Noted: 2018-09-07

## 2018-09-07 RX ORDER — ERGOCALCIFEROL 1.25 MG/1
50000 CAPSULE ORAL WEEKLY
Qty: 12 CAPSULE | Refills: 0 | Status: SHIPPED | OUTPATIENT
Start: 2018-09-07 | End: 2018-12-06

## 2018-09-10 LAB — LEPTIN: 17.6 NG/ML

## 2018-10-01 ENCOUNTER — OFFICE VISIT (OUTPATIENT)
Dept: INTERNAL MEDICINE CLINIC | Facility: CLINIC | Age: 43
End: 2018-10-01

## 2018-10-01 VITALS
SYSTOLIC BLOOD PRESSURE: 120 MMHG | HEART RATE: 88 BPM | DIASTOLIC BLOOD PRESSURE: 84 MMHG | HEIGHT: 68 IN | RESPIRATION RATE: 16 BRPM | BODY MASS INDEX: 38.49 KG/M2 | WEIGHT: 254 LBS

## 2018-10-01 DIAGNOSIS — E55.9 VITAMIN D DEFICIENCY: ICD-10-CM

## 2018-10-01 DIAGNOSIS — Z51.81 THERAPEUTIC DRUG MONITORING: Primary | ICD-10-CM

## 2018-10-01 DIAGNOSIS — E53.8 VITAMIN B12 DEFICIENCY: ICD-10-CM

## 2018-10-01 DIAGNOSIS — E66.01 SEVERE OBESITY (HCC): ICD-10-CM

## 2018-10-01 PROCEDURE — 96372 THER/PROPH/DIAG INJ SC/IM: CPT | Performed by: NURSE PRACTITIONER

## 2018-10-01 PROCEDURE — 99214 OFFICE O/P EST MOD 30 MIN: CPT | Performed by: NURSE PRACTITIONER

## 2018-10-01 RX ORDER — BUPROPION HYDROCHLORIDE 150 MG/1
150 TABLET ORAL DAILY
Qty: 30 TABLET | Refills: 1 | Status: SHIPPED | OUTPATIENT
Start: 2018-10-01 | End: 2019-05-07

## 2018-10-01 RX ORDER — CYANOCOBALAMIN 1000 UG/ML
1000 INJECTION INTRAMUSCULAR; SUBCUTANEOUS ONCE
Status: COMPLETED | OUTPATIENT
Start: 2018-10-01 | End: 2018-10-01

## 2018-10-01 RX ADMIN — CYANOCOBALAMIN 1000 MCG: 1000 INJECTION INTRAMUSCULAR; SUBCUTANEOUS at 11:30:00

## 2018-10-01 NOTE — PROGRESS NOTES
Marcia Montanez is a 36year old female presents today for 1 month follow-up on medical weight loss program for the treatment of overweight, obesity, or morbid obesity with associated low Vitamin B12 and D, mild leptin elevation.     S:  Current weight Wt Readi affect    ASSESSMENT AND PLAN:  Therapeutic drug monitoring  (primary encounter diagnosis)  Severe obesity (hcc)  Vitamin d deficiency  Vitamin b12 deficiency    No orders of the defined types were placed in this encounter.       Meds & Refills for this [employer] [email or web address] blog daily for support and education. Weight Management: Overcoming Your Barriers  You may have many reasons why you’re not ready to lose weight. You may not feel you have the time or the skills.  You may be afraid of losing weight it as an excuse for not losing weight. Ask your healthcare provider or dietitian about methods to lose weight that are safe for you. For example, even if you have severe arthritis, it may be easier for you to exercise in a pool.  Get advice from a fitness p

## 2018-10-01 NOTE — PATIENT INSTRUCTIONS
Continue making lifestyle changes that focus on good nutrition, regular exercise and stress management.     Today we reviewed your labs with findings of: low Vitamin B12 and Vitamin D.    Medication Plan: Start Wellbutrin daily in AM, can restart Saxenda if the kids. · Block off activity time in your schedule. · Borrow some time that you usually spend watching TV.   · You are too important not to take time to exercise—it is your life!   Feel good about yourself  Do you eat more because you feel bad about you

## 2018-10-04 ENCOUNTER — HOSPITAL ENCOUNTER (OUTPATIENT)
Dept: MAMMOGRAPHY | Age: 43
Discharge: HOME | End: 2018-10-04
Attending: FAMILY MEDICINE
Payer: COMMERCIAL

## 2018-10-04 DIAGNOSIS — Z12.39 SCREENING FOR MALIGNANT NEOPLASM OF BREAST: ICD-10-CM

## 2018-10-04 PROCEDURE — 77067 SCR MAMMO BI INCL CAD: CPT | Performed by: FAMILY MEDICINE

## 2018-10-04 PROCEDURE — 77063 BREAST TOMOSYNTHESIS BI: CPT | Performed by: FAMILY MEDICINE

## 2018-11-13 DIAGNOSIS — E03.9 ACQUIRED HYPOTHYROIDISM: ICD-10-CM

## 2018-11-13 RX ORDER — LEVOTHYROXINE SODIUM 0.05 MG/1
50 TABLET ORAL
Qty: 90 TABLET | Refills: 0 | Status: SHIPPED | OUTPATIENT
Start: 2018-11-13 | End: 2019-02-10

## 2018-11-13 NOTE — TELEPHONE ENCOUNTER
Requesting Levothyroxine   LOV: 3/19/18  RTC: 6 months   Last Relevant Labs: pp  Filled: 8/7/18 #90 with 0 refills    No future appointments. Refilled per protocol, note added to schedule office visit.

## 2018-11-26 RX ORDER — ERGOCALCIFEROL 1.25 MG/1
CAPSULE ORAL
Qty: 4 CAPSULE | Refills: 0 | OUTPATIENT
Start: 2018-11-26

## 2018-11-26 NOTE — TELEPHONE ENCOUNTER
Requesting: Ergocalciferol 50,000 units Capsules  LOV: 03/19/18  RTC: 6 months  Last Relevant Labs: 09/06/18  Filled: 09/07/18 #12 with 0 refills    No future appointments.     Notes recorded by Hortensia Chong DO on 9/7/2018 at 9:29 AM CDT  Vit D deficie

## 2018-11-29 RX ORDER — ERGOCALCIFEROL 1.25 MG/1
CAPSULE ORAL
Qty: 4 CAPSULE | Refills: 0 | OUTPATIENT
Start: 2018-11-29

## 2018-11-29 NOTE — TELEPHONE ENCOUNTER
Requesting Vitamin D  LOV: 3/19/18  RTC: 6 months  Last Relevant Labs: 9/6/18  Filled: 9/7/18 #12 with 0 refills    No future appointments.     Denied refill therapy completed was to change to daily 2000 units when done with 50,000 units weekly

## 2019-02-10 DIAGNOSIS — Z00.00 LABORATORY EXAM ORDERED AS PART OF ROUTINE GENERAL MEDICAL EXAMINATION: Primary | ICD-10-CM

## 2019-02-10 DIAGNOSIS — E55.9 VITAMIN D DEFICIENCY: ICD-10-CM

## 2019-02-10 DIAGNOSIS — E03.9 ACQUIRED HYPOTHYROIDISM: ICD-10-CM

## 2019-02-10 DIAGNOSIS — E03.9 HYPOTHYROIDISM, UNSPECIFIED TYPE: ICD-10-CM

## 2019-02-10 DIAGNOSIS — R73.03 PREDIABETES: ICD-10-CM

## 2019-02-11 RX ORDER — LEVOTHYROXINE SODIUM 0.05 MG/1
TABLET ORAL
Qty: 30 TABLET | Refills: 0 | Status: SHIPPED | OUTPATIENT
Start: 2019-02-11 | End: 2019-05-07

## 2019-02-11 NOTE — TELEPHONE ENCOUNTER
Requesting: Levothyroxine 50 mcg tablets  LOV: 3/19/18  RTC: 6 months  Last Relevant Labs: 3/19/18  Filled: 11/13/18 #90 with 0 refills  No future appointments.   There was a note attached to last refill stating that patient needed an appt for further refil

## 2019-02-11 NOTE — TELEPHONE ENCOUNTER
Levothyroxine sent to pharmacy for 30 days. Patient due for AWV with labs in 1 month. Please schedule OV for further refills and get labs 1-2 days before.       Antonio Whitt, DO  Family Medicine

## 2019-03-25 ENCOUNTER — TELEPHONE (OUTPATIENT)
Dept: FAMILY MEDICINE CLINIC | Facility: CLINIC | Age: 44
End: 2019-03-25

## 2019-03-25 NOTE — TELEPHONE ENCOUNTER
Patient has been notified via [employer] reminder her to have her labs done 1-2 days prior to appt. Labs have been ordered.     Future Appointments   Date Time Provider Alesia Herrera   4/1/2019  9:30 AM Bro[place], Verner Stabler, DO [facility] [address] [facility] [address]

## 2019-04-11 ENCOUNTER — TELEPHONE (OUTPATIENT)
Dept: FAMILY MEDICINE CLINIC | Facility: CLINIC | Age: 44
End: 2019-04-11

## 2019-04-11 NOTE — TELEPHONE ENCOUNTER
Patient called and scheduled physical. She advised Dr Geovani Russo had to reschedule one appt and patient stated she had to reschedule today's.  She is requesting a refill of LEVOTHYROXINE SODIUM 50 MCG Oral Tab     Future Appointments   Date Time Provider Depart

## 2019-04-11 NOTE — TELEPHONE ENCOUNTER
Spoke to patient regarding refill. Pt informed she needs to get labs done prior to any further refills. Last thyroid labs done 3/19/18. She can keep her upcoming appt however, she needs to get labs done for further refills.  Pt did say she has #15 pills lef

## 2019-04-17 ENCOUNTER — TELEPHONE (OUTPATIENT)
Dept: FAMILY MEDICINE CLINIC | Facility: CLINIC | Age: 44
End: 2019-04-17

## 2019-04-17 DIAGNOSIS — E53.8 LOW VITAMIN B12 LEVEL: Primary | ICD-10-CM

## 2019-04-18 ENCOUNTER — LAB ENCOUNTER (OUTPATIENT)
Dept: LAB | Age: 44
End: 2019-04-18
Attending: FAMILY MEDICINE
Payer: COMMERCIAL

## 2019-04-18 DIAGNOSIS — E55.9 VITAMIN D DEFICIENCY: ICD-10-CM

## 2019-04-18 DIAGNOSIS — E53.8 LOW VITAMIN B12 LEVEL: ICD-10-CM

## 2019-04-18 DIAGNOSIS — E03.9 ACQUIRED HYPOTHYROIDISM: ICD-10-CM

## 2019-04-18 DIAGNOSIS — R79.0 LOW FERRITIN LEVEL: ICD-10-CM

## 2019-04-18 DIAGNOSIS — Z00.00 LABORATORY EXAM ORDERED AS PART OF ROUTINE GENERAL MEDICAL EXAMINATION: ICD-10-CM

## 2019-04-18 DIAGNOSIS — R73.03 PREDIABETES: ICD-10-CM

## 2019-04-18 PROCEDURE — 83036 HEMOGLOBIN GLYCOSYLATED A1C: CPT | Performed by: FAMILY MEDICINE

## 2019-04-18 PROCEDURE — 82728 ASSAY OF FERRITIN: CPT | Performed by: FAMILY MEDICINE

## 2019-04-18 PROCEDURE — 80050 GENERAL HEALTH PANEL: CPT | Performed by: FAMILY MEDICINE

## 2019-04-18 PROCEDURE — 36415 COLL VENOUS BLD VENIPUNCTURE: CPT | Performed by: FAMILY MEDICINE

## 2019-04-18 PROCEDURE — 82607 VITAMIN B-12: CPT | Performed by: FAMILY MEDICINE

## 2019-04-18 PROCEDURE — 80061 LIPID PANEL: CPT | Performed by: FAMILY MEDICINE

## 2019-04-18 PROCEDURE — 82306 VITAMIN D 25 HYDROXY: CPT | Performed by: FAMILY MEDICINE

## 2019-04-18 NOTE — TELEPHONE ENCOUNTER
Was in today for labs and was asking if B-12 can be added. B12 was low on 9/6/18 per lab note. Lab pended for approval if ok.

## 2019-04-19 DIAGNOSIS — E03.9 ACQUIRED HYPOTHYROIDISM: ICD-10-CM

## 2019-04-19 RX ORDER — LEVOTHYROXINE SODIUM 0.05 MG/1
TABLET ORAL
Qty: 30 TABLET | Refills: 0 | Status: SHIPPED | OUTPATIENT
Start: 2019-04-19 | End: 2019-05-07

## 2019-04-19 NOTE — TELEPHONE ENCOUNTER
Requested Medications      Name from pharmacy: Levothyroxine Sodium 50 Mcg Tab Sand         Will file in chart as: LEVOTHYROXINE SODIUM 50 MCG Oral Tab    The source prescription was discontinued on 8/7/2018 by Matilde Alfonso RN.     Sig: TAKE ONE TABLE

## 2019-05-07 ENCOUNTER — OFFICE VISIT (OUTPATIENT)
Dept: FAMILY MEDICINE CLINIC | Facility: CLINIC | Age: 44
End: 2019-05-07
Payer: COMMERCIAL

## 2019-05-07 VITALS
TEMPERATURE: 99 F | DIASTOLIC BLOOD PRESSURE: 70 MMHG | SYSTOLIC BLOOD PRESSURE: 140 MMHG | WEIGHT: 253 LBS | HEART RATE: 88 BPM | RESPIRATION RATE: 16 BRPM | BODY MASS INDEX: 38.34 KG/M2 | HEIGHT: 68 IN

## 2019-05-07 DIAGNOSIS — E53.8 LOW VITAMIN B12 LEVEL: ICD-10-CM

## 2019-05-07 DIAGNOSIS — R79.0 LOW FERRITIN: ICD-10-CM

## 2019-05-07 DIAGNOSIS — E03.9 ACQUIRED HYPOTHYROIDISM: ICD-10-CM

## 2019-05-07 DIAGNOSIS — Z23 NEED FOR VACCINATION: ICD-10-CM

## 2019-05-07 DIAGNOSIS — Z13.31 NEGATIVE DEPRESSION SCREENING: ICD-10-CM

## 2019-05-07 DIAGNOSIS — E55.9 VITAMIN D DEFICIENCY: ICD-10-CM

## 2019-05-07 DIAGNOSIS — Z00.00 ANNUAL PHYSICAL EXAM: Primary | ICD-10-CM

## 2019-05-07 PROCEDURE — 99396 PREV VISIT EST AGE 40-64: CPT | Performed by: FAMILY MEDICINE

## 2019-05-07 PROCEDURE — 99213 OFFICE O/P EST LOW 20 MIN: CPT | Performed by: FAMILY MEDICINE

## 2019-05-07 PROCEDURE — 90471 IMMUNIZATION ADMIN: CPT | Performed by: FAMILY MEDICINE

## 2019-05-07 PROCEDURE — 90715 TDAP VACCINE 7 YRS/> IM: CPT | Performed by: FAMILY MEDICINE

## 2019-05-07 PROCEDURE — 96372 THER/PROPH/DIAG INJ SC/IM: CPT | Performed by: FAMILY MEDICINE

## 2019-05-07 PROCEDURE — 90472 IMMUNIZATION ADMIN EACH ADD: CPT | Performed by: FAMILY MEDICINE

## 2019-05-07 RX ORDER — LEVOTHYROXINE SODIUM 0.05 MG/1
TABLET ORAL
Qty: 90 TABLET | Refills: 3 | Status: SHIPPED | OUTPATIENT
Start: 2019-05-07 | End: 2020-05-19

## 2019-05-07 RX ORDER — CYANOCOBALAMIN 1000 UG/ML
1000 INJECTION INTRAMUSCULAR; SUBCUTANEOUS ONCE
Status: COMPLETED | OUTPATIENT
Start: 2019-05-07 | End: 2019-05-07

## 2019-05-07 RX ADMIN — CYANOCOBALAMIN 1000 MCG: 1000 INJECTION INTRAMUSCULAR; SUBCUTANEOUS at 10:11:00

## 2019-05-07 NOTE — PATIENT INSTRUCTIONS
Vitamin D 2,000-4,000 iu daily    Thank you for allowing me to participate in your care today. I will contact you with any results from today's visit.   Lab results are typically available in 2-3 days for blood tests, and 3-5 days for any cultures or Pap At age 39 start yearly mammograms. 3    Cervical cancer All women in this age group, except women who have had a complete hysterectomy Pap test every 3 years or Pap test plus human papilloma virus (HPV) test every 5 years   Chlamydia Women at increased risk influenzae Type B (HIB) Women at increased risk 1 to 3 doses   Influenza (flu) All women in this age group Once a year   Measles, mumps, rubella (MMR) All women in this age group who have no record of these infections or vaccines 1 or 2 doses   Meningococc

## 2019-05-07 NOTE — PROGRESS NOTES
Marcia Montanez is a 39year old female that presents for annual physical exam.     Last Pap: 2/15/18 with Dr. Glenn Lira  Hx of abnormal pap: yes at age 21 y/o  STI testing desired: No  Mammogram: 10/4/18  Colonoscopy: N/A  PHQ2: 0  Vaccines: due for TDAP   Frandy Benitez Problem Relation Age of Onset   • Lipids Other         Family History of    • Lipids Mother    • Lipids Father    • Hypertension Father    • Heart Attack Maternal Grandmother         age of death 67   • Other (Brain Aneurysm) Paternal Grandmother Occupational Exposure: Not Asked        Hobby Hazards: Not Asked        Sleep Concern: Not Asked        Stress Concern: Not Asked        Weight Concern: Not Asked        Special Diet: Not Asked        Back Care: Not Asked        Exercise:  Yes          \"Da : 254 lb  09/06/18 : 253 lb  08/21/18 : 257 lb  03/19/18 : 250 lb  02/15/18 : 248 lb      ASSESSMENT AND PLAN:   39year old year old female who presents for a complete physical exam.     1. Annual physical exam  - continue to work on healthy diet and regu

## 2019-06-10 ENCOUNTER — OFFICE VISIT (OUTPATIENT)
Dept: HEMATOLOGY/ONCOLOGY | Age: 44
End: 2019-06-10
Attending: INTERNAL MEDICINE
Payer: COMMERCIAL

## 2019-06-10 VITALS
DIASTOLIC BLOOD PRESSURE: 84 MMHG | HEART RATE: 91 BPM | SYSTOLIC BLOOD PRESSURE: 134 MMHG | OXYGEN SATURATION: 98 % | BODY MASS INDEX: 39 KG/M2 | RESPIRATION RATE: 20 BRPM | TEMPERATURE: 98 F | WEIGHT: 253.63 LBS

## 2019-06-10 DIAGNOSIS — D50.0 IRON DEFICIENCY ANEMIA DUE TO CHRONIC BLOOD LOSS: Primary | ICD-10-CM

## 2019-06-10 DIAGNOSIS — N92.4 EXCESSIVE BLEEDING IN PREMENOPAUSAL PERIOD: ICD-10-CM

## 2019-06-10 PROCEDURE — 99243 OFF/OP CNSLTJ NEW/EST LOW 30: CPT | Performed by: INTERNAL MEDICINE

## 2019-06-10 NOTE — CONSULTS
Cancer Center Report of Consultation    Patient Name: Willow Lawson   YOB: 1975   Medical Record Number: [id number]   CSN: [id number]   Consulting Physician: Neyda Mann MD  Referring Physician(s): Juan Mitchell  Date of Consultation: 6/ Take by mouth., Disp: , Rfl:     Review of Systems:    Constitutional: No chills, fevers, malaise, night sweats and weight loss. Fatigue  Eyes: No visual disturbance, irritation and redness.   Ears, nose, mouth, throat, and face: No hearing loss, tinnitus, iron.  If no improvement on levels drop or she becomes symptomatic, can consider IV iron. Patient agreeable. We will contact her when her lab results are back in a month for further plan of care.     Orders Placed This Encounter        CBC W/DIFF      F

## 2019-06-10 NOTE — PATIENT INSTRUCTIONS
For Dr. Oj Mao nurse line, call [phone] with any questions or concerns,  Monday through Friday 8:00 to 4:30.      After hours or weekends for urgent needs: [phone].   Central [phone]  Medical Records:   [phone]  Cancer [facility]

## 2019-06-10 NOTE — PROGRESS NOTES
Pt here for iron deficiency anemia. Referred by PCP. Pt started OTC iron supplement 2-3 weeks ago and takes supplement 3-4 times per week. Pt has some fatigue. States she has 2 out of 5 days of heavy menses.

## 2020-01-23 ENCOUNTER — PATIENT MESSAGE (OUTPATIENT)
Dept: FAMILY MEDICINE CLINIC | Facility: CLINIC | Age: 45
End: 2020-01-23

## 2020-01-23 DIAGNOSIS — Z12.31 ENCOUNTER FOR SCREENING MAMMOGRAM FOR MALIGNANT NEOPLASM OF BREAST: Primary | ICD-10-CM

## 2020-01-23 NOTE — TELEPHONE ENCOUNTER
From: Neal Larsen  To: Curtis Carnes DO  Sent: 1/23/2020 10:22 AM CST  Subject: Non-Urgent Medical Question    Can you order a routine mammogram for me please? No symptoms. I like to go yearly. Thanks!   [employer]

## 2020-02-13 ENCOUNTER — HOSPITAL ENCOUNTER (OUTPATIENT)
Dept: MAMMOGRAPHY | Age: 45
Discharge: HOME | End: 2020-02-13
Attending: FAMILY MEDICINE
Payer: COMMERCIAL

## 2020-02-13 DIAGNOSIS — Z12.31 ENCOUNTER FOR SCREENING MAMMOGRAM FOR MALIGNANT NEOPLASM OF BREAST: ICD-10-CM

## 2020-02-13 PROCEDURE — 77063 BREAST TOMOSYNTHESIS BI: CPT | Performed by: FAMILY MEDICINE

## 2020-02-13 PROCEDURE — 77067 SCR MAMMO BI INCL CAD: CPT | Performed by: FAMILY MEDICINE

## 2020-05-16 DIAGNOSIS — E03.9 ACQUIRED HYPOTHYROIDISM: ICD-10-CM

## 2020-05-18 RX ORDER — LEVOTHYROXINE SODIUM 0.05 MG/1
TABLET ORAL
Qty: 90 TABLET | Refills: 0 | OUTPATIENT
Start: 2020-05-18

## 2020-05-18 NOTE — TELEPHONE ENCOUNTER
Requested Prescriptions     Pending Prescriptions Disp Refills   • LEVOTHYROXINE SODIUM 50 MCG Oral Tab [Pharmacy Med Name: Levothyroxine Sodium 50 Mcg Tab Lupi] 90 tablet 0     Sig: TAKE ONE TABLET BY MOUTH BEFORE BREAKFAST       LOV: 5/7/19 for annual ph

## 2020-05-18 NOTE — TELEPHONE ENCOUNTER
Future Appointments   Date Time Provider Alesia Herrera   5/19/2020  1:00 PM Chichi Flood,  [facility] [address] [facility] [address]     Patient verbally consents to a virtual/telephone check-in service for the date and time noted above.  Patient understands and accept

## 2020-05-19 ENCOUNTER — VIRTUAL PHONE E/M (OUTPATIENT)
Dept: FAMILY MEDICINE CLINIC | Facility: CLINIC | Age: 45
End: 2020-05-19
Payer: COMMERCIAL

## 2020-05-19 DIAGNOSIS — Z00.00 LABORATORY EXAM ORDERED AS PART OF ROUTINE GENERAL MEDICAL EXAMINATION: ICD-10-CM

## 2020-05-19 DIAGNOSIS — E53.8 LOW VITAMIN B12 LEVEL: ICD-10-CM

## 2020-05-19 DIAGNOSIS — R79.0 LOW FERRITIN: ICD-10-CM

## 2020-05-19 DIAGNOSIS — R73.03 PREDIABETES: ICD-10-CM

## 2020-05-19 DIAGNOSIS — E03.9 ACQUIRED HYPOTHYROIDISM: Primary | ICD-10-CM

## 2020-05-19 DIAGNOSIS — E55.9 VITAMIN D DEFICIENCY: ICD-10-CM

## 2020-05-19 PROCEDURE — 99213 OFFICE O/P EST LOW 20 MIN: CPT | Performed by: FAMILY MEDICINE

## 2020-05-19 RX ORDER — LEVOTHYROXINE SODIUM 0.05 MG/1
TABLET ORAL
Qty: 90 TABLET | Refills: 0 | Status: SHIPPED | OUTPATIENT
Start: 2020-05-19 | End: 2020-09-23

## 2020-05-19 NOTE — PROGRESS NOTES
Virtual/Telephone Check-In    Juan Coello verbally consents to a Virtual/Telephone Check-In service on 05/19/20. Patient understands and accepts financial responsibility for any deductible, co-insurance and/or co-pays associated with this service.     Du lymph nodes, can move neck in all directions without pain  RESP: no cough noted, no hoarseness, no audible wheezing  ABDOMEN: pt denies any tenderness when she presses on abdomen   NEURO: A&OX3, mood and behavior appropriate, able to walk normally, pt is a

## 2020-06-18 ENCOUNTER — OFFICE VISIT (OUTPATIENT)
Dept: OBGYN CLINIC | Facility: CLINIC | Age: 45
End: 2020-06-18
Payer: COMMERCIAL

## 2020-06-18 VITALS
DIASTOLIC BLOOD PRESSURE: 78 MMHG | SYSTOLIC BLOOD PRESSURE: 122 MMHG | WEIGHT: 258 LBS | HEART RATE: 92 BPM | BODY MASS INDEX: 39.1 KG/M2 | HEIGHT: 68 IN

## 2020-06-18 DIAGNOSIS — Z12.4 CERVICAL CANCER SCREENING: ICD-10-CM

## 2020-06-18 DIAGNOSIS — Z30.432 ENCOUNTER FOR REMOVAL OF INTRAUTERINE CONTRACEPTIVE DEVICE: ICD-10-CM

## 2020-06-18 DIAGNOSIS — Z01.419 ENCOUNTER FOR WELL WOMAN EXAM WITH ROUTINE GYNECOLOGICAL EXAM: Primary | ICD-10-CM

## 2020-06-18 PROCEDURE — 87624 HPV HI-RISK TYP POOLED RSLT: CPT | Performed by: OBSTETRICS & GYNECOLOGY

## 2020-06-18 PROCEDURE — 88175 CYTOPATH C/V AUTO FLUID REDO: CPT | Performed by: OBSTETRICS & GYNECOLOGY

## 2020-06-18 PROCEDURE — 58301 REMOVE INTRAUTERINE DEVICE: CPT | Performed by: OBSTETRICS & GYNECOLOGY

## 2020-06-18 PROCEDURE — 99396 PREV VISIT EST AGE 40-64: CPT | Performed by: OBSTETRICS & GYNECOLOGY

## 2020-06-18 NOTE — PROGRESS NOTES
Marysue Cranker is a 38year old female  Patient's last menstrual period was 2020. Patient presents with:  Wellness Visit: pt c/o pelvic pain on left side. Feels it mostly at night and wakes her up.   IUD: Paragard IUD removal. Does not want reinse Used    Substance and Sexual Activity      Alcohol use: Not Currently        Frequency: Never        Comment: once per month      Drug use: No      Sexual activity: Yes        Partners: Male        Birth control/protection: I.U.D.     Lifestyle      Physica breakfast, Disp: 90 tablet, Rfl: 0  •  B Complex-C-Folic Acid (HM VITAMIN B COMPLEX/VITAMIN C) Oral Tab, Take by mouth., Disp: , Rfl:     ALLERGIES:  No Known Allergies      Review of Systems:  Constitutional:  Denies fatigue, night sweats, hot flashes  Be Childs tenderness  Perineum: normal  Anus: no hemorroids     If abdominal discomfort persists advised to follow up with PCP  Assessment & Plan:  Diagnoses and all orders for this visit:    Encounter for well woman exam with routine gynecological exam    Cervical

## 2020-07-20 ENCOUNTER — PATIENT MESSAGE (OUTPATIENT)
Dept: FAMILY MEDICINE CLINIC | Facility: CLINIC | Age: 45
End: 2020-07-20

## 2020-07-20 NOTE — TELEPHONE ENCOUNTER
From: [facility] Ruy  To: Shanique Li DO  Sent: 7/20/2020 11:04 AM CDT  Subject: Non-Urgent Medical Question    Can you add a Hep B titer check time my bloodwork order please?    Thanks,  [employer]

## 2020-08-06 ENCOUNTER — E-VISIT (OUTPATIENT)
Dept: FAMILY MEDICINE CLINIC | Facility: CLINIC | Age: 45
End: 2020-08-06

## 2020-08-06 DIAGNOSIS — J06.9 VIRAL UPPER RESPIRATORY TRACT INFECTION: Primary | ICD-10-CM

## 2020-08-06 PROCEDURE — 99421 OL DIG E/M SVC 5-10 MIN: CPT | Performed by: NURSE PRACTITIONER

## 2020-08-06 NOTE — PROGRESS NOTES
Yandy Burgos is a 38year old female. HPI:   See answers to questions above.      Current Outpatient Medications   Medication Sig Dispense Refill   • Levothyroxine Sodium 50 MCG Oral Tab TAKE ONE TABLET BY MOUTH before breakfast 90 tablet 0   • B Complex-C-

## 2020-09-18 DIAGNOSIS — E03.9 ACQUIRED HYPOTHYROIDISM: ICD-10-CM

## 2020-09-19 NOTE — TELEPHONE ENCOUNTER
Requested Prescriptions     Name from pharmacy: Levothyroxine Sodium Naseem Zelaya         Will file in chart as: LEVOTHYROXINE SODIUM 50 MCG Oral Tab    Sig: TAKE ONE TABLET BY MOUTH ONCE DAILY BEFORE BREAKFAST    Disp:  90 tablet    Refills:  0    Star

## 2020-09-23 DIAGNOSIS — E03.9 ACQUIRED HYPOTHYROIDISM: ICD-10-CM

## 2020-09-23 RX ORDER — LEVOTHYROXINE SODIUM 0.05 MG/1
TABLET ORAL
Qty: 30 TABLET | Refills: 0 | Status: SHIPPED | OUTPATIENT
Start: 2020-09-23 | End: 2020-11-16

## 2020-09-23 NOTE — TELEPHONE ENCOUNTER
Requested Prescriptions     Levothyroxine Sodium 50 MCG Oral Tab          Possible duplicate: Odin to review recent actions on this medication    Sig: TAKE ONE TABLET BY MOUTH before breakfast    Disp:  90 tablet    Refills:  0    Start: 9/23/2020    Pop

## 2020-10-05 ENCOUNTER — APPOINTMENT (OUTPATIENT)
Dept: LAB | Age: 45
End: 2020-10-05
Attending: FAMILY MEDICINE
Payer: COMMERCIAL

## 2020-10-08 ENCOUNTER — TELEPHONE (OUTPATIENT)
Dept: FAMILY MEDICINE CLINIC | Facility: CLINIC | Age: 45
End: 2020-10-08

## 2020-10-08 RX ORDER — LEVOTHYROXINE SODIUM 0.05 MG/1
TABLET ORAL
Qty: 90 TABLET | Refills: 0 | Status: SHIPPED | OUTPATIENT
Start: 2020-10-08 | End: 2020-11-16

## 2020-10-08 NOTE — TELEPHONE ENCOUNTER
Pt is requesting a call with lab results from 10/05/2020.     Please call [phone] Can leave message

## 2020-10-08 NOTE — TELEPHONE ENCOUNTER
Future Appointments   Date Time Provider Alesia Herrera   10/26/2020 11:30 AM Shaylee Flood, DO [facility] [address] [facility] [address]

## 2020-10-08 NOTE — TELEPHONE ENCOUNTER
Informed pt that Dr. Niles Ernst will discuss the results of her labs in detail at her upcoming appointment, pt verbalized understanding but asking for all results to be released to [address]   Pt states she knows they must be abnormal because all the labs with n

## 2020-10-08 NOTE — TELEPHONE ENCOUNTER
Please see TE, pt asking for all results be released to [employer] with the understanding they will be discussed at upcoming appointment. Ok to release results?   Future Appointments   Date Time Provider Alesia Herrera   10/29/2020 10:30 AM Blair Dan

## 2020-10-26 ENCOUNTER — TELEPHONE (OUTPATIENT)
Dept: FAMILY MEDICINE CLINIC | Facility: CLINIC | Age: 45
End: 2020-10-26

## 2020-10-26 NOTE — TELEPHONE ENCOUNTER
Levothyroxine Sodium 50 MCG Oral Tab    [id number] - [id number] [place] [address], [phone]  _________________  Future Appointments   Date Time Provider Alesia Herrera   11/16/2020 11:30 AM Emily Flood, DO [facility] [address]

## 2020-10-30 NOTE — TELEPHONE ENCOUNTER
Spoke with [place], confirmed that Rx was not received by pharmacy. Verbal given for refill with 90 tablets, no refills. Rx will be filled today.

## 2020-10-30 NOTE — TELEPHONE ENCOUNTER
Called pt to inform her that I spoke with pharmacy and her levothyroxine will be refilled today.   Apologized for the inconvenience that the pharmacy did not receive the refill sent on 10/8, pt verbalized understanding ans was appreciative of refill being t

## 2020-10-30 NOTE — TELEPHONE ENCOUNTER
[place] from [address] [place] [address] calling about status on refill, pharmacy stated refill from 10-8 not received.  Please call with verbal.

## 2020-11-16 ENCOUNTER — OFFICE VISIT (OUTPATIENT)
Dept: FAMILY MEDICINE CLINIC | Facility: CLINIC | Age: 45
End: 2020-11-16
Payer: COMMERCIAL

## 2020-11-16 VITALS
SYSTOLIC BLOOD PRESSURE: 126 MMHG | WEIGHT: 259 LBS | HEIGHT: 68 IN | HEART RATE: 99 BPM | BODY MASS INDEX: 39.25 KG/M2 | TEMPERATURE: 99 F | RESPIRATION RATE: 16 BRPM | DIASTOLIC BLOOD PRESSURE: 90 MMHG

## 2020-11-16 DIAGNOSIS — E53.8 LOW VITAMIN B12 LEVEL: ICD-10-CM

## 2020-11-16 DIAGNOSIS — Z12.31 SCREENING MAMMOGRAM, ENCOUNTER FOR: ICD-10-CM

## 2020-11-16 DIAGNOSIS — E55.9 VITAMIN D DEFICIENCY: ICD-10-CM

## 2020-11-16 DIAGNOSIS — Z13.31 NEGATIVE DEPRESSION SCREENING: ICD-10-CM

## 2020-11-16 DIAGNOSIS — E03.9 ACQUIRED HYPOTHYROIDISM: ICD-10-CM

## 2020-11-16 DIAGNOSIS — Z00.00 ANNUAL PHYSICAL EXAM: Primary | ICD-10-CM

## 2020-11-16 PROCEDURE — 3080F DIAST BP >= 90 MM HG: CPT | Performed by: FAMILY MEDICINE

## 2020-11-16 PROCEDURE — 99213 OFFICE O/P EST LOW 20 MIN: CPT | Performed by: FAMILY MEDICINE

## 2020-11-16 PROCEDURE — 96372 THER/PROPH/DIAG INJ SC/IM: CPT | Performed by: FAMILY MEDICINE

## 2020-11-16 PROCEDURE — 3074F SYST BP LT 130 MM HG: CPT | Performed by: FAMILY MEDICINE

## 2020-11-16 PROCEDURE — 99396 PREV VISIT EST AGE 40-64: CPT | Performed by: FAMILY MEDICINE

## 2020-11-16 PROCEDURE — 3008F BODY MASS INDEX DOCD: CPT | Performed by: FAMILY MEDICINE

## 2020-11-16 RX ORDER — CYANOCOBALAMIN 1000 UG/ML
1000 INJECTION INTRAMUSCULAR; SUBCUTANEOUS ONCE
Status: COMPLETED | OUTPATIENT
Start: 2020-11-16 | End: 2020-11-16

## 2020-11-16 RX ORDER — LEVOTHYROXINE SODIUM 0.05 MG/1
TABLET ORAL
Qty: 90 TABLET | Refills: 3 | Status: SHIPPED | OUTPATIENT
Start: 2020-11-16

## 2020-11-16 RX ADMIN — CYANOCOBALAMIN 1000 MCG: 1000 INJECTION INTRAMUSCULAR; SUBCUTANEOUS at 12:08:00

## 2020-11-16 NOTE — PATIENT INSTRUCTIONS
Thank you for allowing me to participate in your care today. I will contact you with any results from today's visit. Lab results are typically available in 2-3 days for blood tests, and 3-5 days for any cultures or Paps.    Please let me know if you hav mammograms. 3    Cervical cancer All women in this age group, except women who have had a complete hysterectomy Pap test every 3 years or Pap test plus human papilloma virus (HPV) test every 5 years   Chlamydia Women at increased risk for infection At [facility] Women at increased risk 1 to 3 doses   Influenza (flu) All women in this age group Once a year   Measles, mumps, rubella (MMR) All women in this age group who have no record of these infections or vaccines 1 or 2 doses   Meningococcal Women at increased ri

## 2020-11-16 NOTE — PROGRESS NOTES
Dulce Villarreal is a 54year old female that presents for annual physical exam.     Patient presents with:  Physical: PHQ2: 0, CSSR: Neg      Last Pap: Pap Smear,3 Years due on 06/18/2023  Hx of abnormal pap: no  STI testing desired: no  Mammogram: Mammog 2003, 2005, 2009     Family History   Problem Relation Age of Onset   • Lipids Other         Family History of    • Lipids Mother    • Lipids Father    • Hypertension Father    • Heart Attack Maternal Grandmother         age of death 67   • Other (Brain An Caffeine Concern:  Yes          \"1 cup every other day\"         Occupational Exposure: Not Asked        Hobby Hazards: Not Asked        Sleep Concern: Not Asked        Stress Concern: Not Asked        Weight Concern: Not Asked        Special Diet: Not Ask sensory are grossly intact  BREAST: no dominant or suspicious mass  : introitus is normal, scant discharge, cervix is pink, no adnexal masses or tenderness    Wt Readings from Last 6 Encounters:  11/16/20 : 259 lb (117.5 kg)  06/18/20 : 258 lb (117 kg)

## 2021-04-09 DIAGNOSIS — Z23 NEED FOR VACCINATION: ICD-10-CM

## 2021-06-05 ENCOUNTER — HOSPITAL ENCOUNTER (OUTPATIENT)
Dept: MAMMOGRAPHY | Age: 46
Discharge: HOME | End: 2021-06-05
Attending: FAMILY MEDICINE
Payer: COMMERCIAL

## 2021-06-05 DIAGNOSIS — Z12.31 SCREENING MAMMOGRAM, ENCOUNTER FOR: ICD-10-CM

## 2021-06-05 PROCEDURE — 77063 BREAST TOMOSYNTHESIS BI: CPT | Performed by: FAMILY MEDICINE

## 2021-06-05 PROCEDURE — 77067 SCR MAMMO BI INCL CAD: CPT | Performed by: FAMILY MEDICINE

## 2021-09-30 ENCOUNTER — ORDER TRANSCRIPTION (OUTPATIENT)
Dept: ADMINISTRATIVE | Facility: HOSPITAL | Age: 46
End: 2021-09-30

## 2021-09-30 DIAGNOSIS — Z13.6 SCREENING FOR CARDIOVASCULAR CONDITION: Primary | ICD-10-CM

## 2021-10-21 ENCOUNTER — HOSPITAL ENCOUNTER (OUTPATIENT)
Dept: CT IMAGING | Age: 46
Discharge: HOME | End: 2021-10-21
Attending: FAMILY MEDICINE

## 2021-10-21 DIAGNOSIS — Z13.6 SCREENING FOR CARDIOVASCULAR CONDITION: ICD-10-CM

## 2022-01-31 ENCOUNTER — OFFICE VISIT (OUTPATIENT)
Dept: FAMILY MEDICINE CLINIC | Facility: CLINIC | Age: 47
End: 2022-01-31
Payer: COMMERCIAL

## 2022-01-31 VITALS
BODY MASS INDEX: 39.1 KG/M2 | DIASTOLIC BLOOD PRESSURE: 80 MMHG | HEIGHT: 68 IN | HEART RATE: 79 BPM | OXYGEN SATURATION: 98 % | TEMPERATURE: 98 F | WEIGHT: 258 LBS | RESPIRATION RATE: 16 BRPM | SYSTOLIC BLOOD PRESSURE: 130 MMHG

## 2022-01-31 DIAGNOSIS — R06.83 SNORING: ICD-10-CM

## 2022-01-31 DIAGNOSIS — R53.83 FATIGUE, UNSPECIFIED TYPE: ICD-10-CM

## 2022-01-31 DIAGNOSIS — Z12.11 COLON CANCER SCREENING: ICD-10-CM

## 2022-01-31 DIAGNOSIS — E53.8 LOW VITAMIN B12 LEVEL: ICD-10-CM

## 2022-01-31 DIAGNOSIS — E03.9 ACQUIRED HYPOTHYROIDISM: ICD-10-CM

## 2022-01-31 DIAGNOSIS — R73.03 PREDIABETES: ICD-10-CM

## 2022-01-31 DIAGNOSIS — E55.9 VITAMIN D DEFICIENCY: ICD-10-CM

## 2022-01-31 DIAGNOSIS — Z00.00 ANNUAL PHYSICAL EXAM: Primary | ICD-10-CM

## 2022-01-31 DIAGNOSIS — Z12.31 BREAST CANCER SCREENING BY MAMMOGRAM: ICD-10-CM

## 2022-01-31 PROCEDURE — 99396 PREV VISIT EST AGE 40-64: CPT | Performed by: FAMILY MEDICINE

## 2022-01-31 PROCEDURE — 99213 OFFICE O/P EST LOW 20 MIN: CPT | Performed by: FAMILY MEDICINE

## 2022-01-31 PROCEDURE — 3075F SYST BP GE 130 - 139MM HG: CPT | Performed by: FAMILY MEDICINE

## 2022-01-31 PROCEDURE — 3079F DIAST BP 80-89 MM HG: CPT | Performed by: FAMILY MEDICINE

## 2022-01-31 PROCEDURE — 3008F BODY MASS INDEX DOCD: CPT | Performed by: FAMILY MEDICINE

## 2022-01-31 NOTE — PROGRESS NOTES
Gayla Calzada is a 51year old female that presents for annual physical exam.     Patient presents with: Well Adult: Sees Gyne .     Obstructive Sleep Apnea (BRADEN)  Finger Pain      Last Pap: Pap Smear,5 Years due on 06/18/2025  Hx of abnormal pap: no  S ventricular tachycardia ([facility] [address] 2017   • Obesity 2012   • PVC (premature ventricular contraction) 10/5/2017     Past Surgical History:   Procedure Laterality Date   •   , ,      Family History   Problem Relation Age of Onset Connections: Not on file  Intimate Partner Violence: Not on file  Housing Stability: Not on file    REVIEW OF SYSTEMS:   GENERAL: feels well otherwise  SKIN: denies any unusual skin lesions  EYES: denies blurred vision or double vision  HEENT: denies nasal physical exam  - CBC WITH DIFFERENTIAL WITH PLATELET  - COMP METABOLIC PANEL (14)  - LIPID PANEL  - TSH W REFLEX TO FREE T4  - continue to work on healthy diet and regular exercise  - breast self awareness taught  - pt declines vaccines today but will cons

## 2022-01-31 NOTE — PATIENT INSTRUCTIONS
Thank you for allowing me to participate in your care today. I will contact you with any results from today's visit. Lab results are typically available in 2-3 days for blood tests, and 3-5 days for any cultures or Paps.    Please let me know if you hav mammograms. 3    Cervical cancer All women in this age group, except women who have had a complete hysterectomy Pap test every 3 years or Pap test plus human papilloma virus (HPV) test every 5 years   Chlamydia Women at increased risk for infection At [facility] Women at increased risk 1 to 3 doses   Influenza (flu) All women in this age group Once a year   Measles, mumps, rubella (MMR) All women in this age group who have no record of these infections or vaccines 1 or 2 doses   Meningococcal Women at increased ri

## 2022-02-21 RX ORDER — LEVOTHYROXINE SODIUM 0.05 MG/1
TABLET ORAL
Qty: 30 TABLET | Refills: 0 | Status: SHIPPED | OUTPATIENT
Start: 2022-02-21

## 2022-02-21 NOTE — TELEPHONE ENCOUNTER
LOV 1/31/22    LAST LAB 10/5/20     LAST RX   Levothyroxine Sodium 50 MCG Oral Tab 90 tablet 3 11/16/2020   Sig: TAKE ONE TABLET BY MOUTH before breakfast      Next OV No future appointments. PROTOCOL    Thyroid Supplements Protocol Failed 02/19/2022 01:32 AM   Protocol Details  TSH test in past 12 months    TSH value between 0.350 and 5.500 IU/ml    Appointment in past 12 or next 3 months        Please advise?

## 2022-04-23 LAB
ABSOLUTE BASOPHILS: 29 CELLS/UL (ref 0–200)
ABSOLUTE EOSINOPHILS: 81 CELLS/UL (ref 15–500)
ABSOLUTE LYMPHOCYTES: 1189 CELLS/UL (ref 850–3900)
ABSOLUTE MONOCYTES: 365 CELLS/UL (ref 200–950)
ABSOLUTE NEUTROPHILS: 4135 CELLS/UL (ref 1500–7800)
ALBUMIN/GLOBULIN RATIO: 1.7 (CALC) (ref 1–2.5)
ALBUMIN: 4.4 G/DL (ref 3.6–5.1)
ALKALINE PHOSPHATASE: 58 U/L (ref 31–125)
ALT: 13 U/L (ref 6–29)
AST: 16 U/L (ref 10–35)
BASOPHILS: 0.5 %
BILIRUBIN, TOTAL: 0.7 MG/DL (ref 0.2–1.2)
BUN: 9 MG/DL (ref 7–25)
CALCIUM: 9.4 MG/DL (ref 8.6–10.2)
CARBON DIOXIDE: 31 MMOL/L (ref 20–32)
CHLORIDE: 101 MMOL/L (ref 98–110)
CHOL/HDLC RATIO: 3.8 (CALC)
CHOLESTEROL, TOTAL: 260 MG/DL
CREATININE: 0.7 MG/DL (ref 0.5–1.1)
EGFR IF AFRICN AM: 120 ML/MIN/1.73M2
EGFR IF NONAFRICN AM: 103 ML/MIN/1.73M2
EOSINOPHILS: 1.4 %
GLOBULIN: 2.6 G/DL (CALC) (ref 1.9–3.7)
GLUCOSE: 98 MG/DL (ref 65–99)
HDL CHOLESTEROL: 69 MG/DL
HEMATOCRIT: 42.2 % (ref 35–45)
HEMOGLOBIN A1C: 5.7 % OF TOTAL HGB
HEMOGLOBIN: 14.2 G/DL (ref 11.7–15.5)
LDL-CHOLESTEROL: 169 MG/DL (CALC)
LYMPHOCYTES: 20.5 %
MCH: 29.4 PG (ref 27–33)
MCHC: 33.6 G/DL (ref 32–36)
MCV: 87.4 FL (ref 80–100)
MONOCYTES: 6.3 %
MPV: 9.6 FL (ref 7.5–12.5)
NEUTROPHILS: 71.3 %
NON-HDL CHOLESTEROL: 191 MG/DL (CALC)
PLATELET COUNT: 337 THOUSAND/UL (ref 140–400)
POTASSIUM: 4.1 MMOL/L (ref 3.5–5.3)
PROTEIN, TOTAL: 7 G/DL (ref 6.1–8.1)
RDW: 12.9 % (ref 11–15)
RED BLOOD CELL COUNT: 4.83 MILLION/UL (ref 3.8–5.1)
SODIUM: 138 MMOL/L (ref 135–146)
TRIGLYCERIDES: 100 MG/DL
TSH W/REFLEX TO FT4: 2.18 MIU/L
VITAMIN B12: 281 PG/ML (ref 200–1100)
VITAMIN D, 25-OH, TOTAL: 23 NG/ML (ref 30–100)
WHITE BLOOD CELL COUNT: 5.8 THOUSAND/UL (ref 3.8–10.8)

## 2022-04-25 RX ORDER — LEVOTHYROXINE SODIUM 0.05 MG/1
50 TABLET ORAL
Qty: 30 TABLET | Refills: 0 | Status: SHIPPED | OUTPATIENT
Start: 2022-04-25

## 2022-04-26 RX ORDER — ERGOCALCIFEROL 1.25 MG/1
50000 CAPSULE ORAL WEEKLY
Qty: 12 CAPSULE | Refills: 0 | Status: SHIPPED | OUTPATIENT
Start: 2022-04-26 | End: 2022-07-25

## 2022-04-28 ENCOUNTER — TELEPHONE (OUTPATIENT)
Dept: FAMILY MEDICINE CLINIC | Facility: CLINIC | Age: 47
End: 2022-04-28

## 2022-05-03 ENCOUNTER — NURSE ONLY (OUTPATIENT)
Dept: FAMILY MEDICINE CLINIC | Facility: CLINIC | Age: 47
End: 2022-05-03
Payer: COMMERCIAL

## 2022-05-03 DIAGNOSIS — E53.8 LOW VITAMIN B12 LEVEL: Primary | ICD-10-CM

## 2022-05-03 PROCEDURE — 96372 THER/PROPH/DIAG INJ SC/IM: CPT | Performed by: FAMILY MEDICINE

## 2022-05-03 RX ORDER — CYANOCOBALAMIN 1000 UG/ML
1000 INJECTION INTRAMUSCULAR; SUBCUTANEOUS ONCE
Status: COMPLETED | OUTPATIENT
Start: 2022-05-03 | End: 2022-05-03

## 2022-05-03 RX ADMIN — CYANOCOBALAMIN 1000 MCG: 1000 INJECTION INTRAMUSCULAR; SUBCUTANEOUS at 14:10:00

## 2022-05-23 DIAGNOSIS — E03.9 ACQUIRED HYPOTHYROIDISM: ICD-10-CM

## 2022-05-23 RX ORDER — LEVOTHYROXINE SODIUM 0.05 MG/1
50 TABLET ORAL
Qty: 30 TABLET | Refills: 0 | Status: SHIPPED | OUTPATIENT
Start: 2022-05-23

## 2022-06-06 ENCOUNTER — NURSE ONLY (OUTPATIENT)
Dept: FAMILY MEDICINE CLINIC | Facility: CLINIC | Age: 47
End: 2022-06-06
Payer: COMMERCIAL

## 2022-06-06 DIAGNOSIS — E53.8 LOW VITAMIN B12 LEVEL: Primary | ICD-10-CM

## 2022-06-06 RX ORDER — CYANOCOBALAMIN 1000 UG/ML
1000 INJECTION INTRAMUSCULAR; SUBCUTANEOUS ONCE
Status: COMPLETED | OUTPATIENT
Start: 2022-06-06 | End: 2022-06-06

## 2022-06-06 RX ADMIN — CYANOCOBALAMIN 1000 MCG: 1000 INJECTION INTRAMUSCULAR; SUBCUTANEOUS at 14:48:00

## 2022-06-06 NOTE — PROGRESS NOTES
Pt here for B12 #2, given in left deltoid. Pt tolerated well with no adverse events. Pt scheduled next B12 injection.    Future Appointments   Date Time Provider Alesia Herrera   7/14/2022  1:30 PM [facility] 20 NURSE [facility] [address]

## 2022-06-18 DIAGNOSIS — E03.9 ACQUIRED HYPOTHYROIDISM: ICD-10-CM

## 2022-06-20 RX ORDER — LEVOTHYROXINE SODIUM 0.05 MG/1
50 TABLET ORAL
Qty: 30 TABLET | Refills: 0 | Status: SHIPPED | OUTPATIENT
Start: 2022-06-20

## 2022-06-20 RX ORDER — LEVOTHYROXINE SODIUM 0.05 MG/1
50 TABLET ORAL
Qty: 30 TABLET | Refills: 0 | OUTPATIENT
Start: 2022-06-20

## 2022-07-14 ENCOUNTER — NURSE ONLY (OUTPATIENT)
Dept: FAMILY MEDICINE CLINIC | Facility: CLINIC | Age: 47
End: 2022-07-14
Payer: COMMERCIAL

## 2022-07-14 DIAGNOSIS — E53.8 LOW VITAMIN B12 LEVEL: Primary | ICD-10-CM

## 2022-07-14 PROCEDURE — 96372 THER/PROPH/DIAG INJ SC/IM: CPT | Performed by: FAMILY MEDICINE

## 2022-07-14 RX ORDER — CYANOCOBALAMIN 1000 UG/ML
1000 INJECTION INTRAMUSCULAR; SUBCUTANEOUS ONCE
Status: COMPLETED | OUTPATIENT
Start: 2022-07-14 | End: 2022-07-14

## 2022-07-14 RX ADMIN — CYANOCOBALAMIN 1000 MCG: 1000 INJECTION INTRAMUSCULAR; SUBCUTANEOUS at 13:39:00

## 2022-07-14 NOTE — PROGRESS NOTES
Pt here for B12 #3 given in right deltoid. Pt tolerated well with no adverse events. Pt scheduled next B12 injection.    Future Appointments   Date Time Provider Alesia Herrera   8/1/2022  3:40 PM PF [facility] PF [facility]   8/25/2022  2:00 PM EM[facility] 20 NURSE [facility] [address]

## 2022-07-26 DIAGNOSIS — E03.9 ACQUIRED HYPOTHYROIDISM: ICD-10-CM

## 2022-07-26 RX ORDER — LEVOTHYROXINE SODIUM 0.05 MG/1
TABLET ORAL
Qty: 90 TABLET | Refills: 1 | Status: SHIPPED | OUTPATIENT
Start: 2022-07-26

## 2022-08-01 ENCOUNTER — HOSPITAL ENCOUNTER (OUTPATIENT)
Dept: MAMMOGRAPHY | Age: 47
Discharge: HOME | End: 2022-08-01
Attending: FAMILY MEDICINE
Payer: COMMERCIAL

## 2022-08-01 DIAGNOSIS — Z12.31 BREAST CANCER SCREENING BY MAMMOGRAM: ICD-10-CM

## 2022-08-01 PROCEDURE — 77063 BREAST TOMOSYNTHESIS BI: CPT | Performed by: FAMILY MEDICINE

## 2022-08-01 PROCEDURE — 77067 SCR MAMMO BI INCL CAD: CPT | Performed by: FAMILY MEDICINE

## 2022-08-19 ENCOUNTER — HOSPITAL ENCOUNTER (OUTPATIENT)
Dept: MAMMOGRAPHY | Age: 47
Discharge: HOME | End: 2022-08-19
Attending: FAMILY MEDICINE
Payer: COMMERCIAL

## 2022-08-19 DIAGNOSIS — R92.2 INCONCLUSIVE MAMMOGRAM: ICD-10-CM

## 2022-08-19 PROCEDURE — 77061 BREAST TOMOSYNTHESIS UNI: CPT | Performed by: FAMILY MEDICINE

## 2022-08-19 PROCEDURE — 77065 DX MAMMO INCL CAD UNI: CPT | Performed by: FAMILY MEDICINE

## 2022-08-29 ENCOUNTER — NURSE ONLY (OUTPATIENT)
Dept: FAMILY MEDICINE CLINIC | Facility: CLINIC | Age: 47
End: 2022-08-29
Payer: COMMERCIAL

## 2022-08-29 DIAGNOSIS — E53.8 LOW VITAMIN B12 LEVEL: Primary | ICD-10-CM

## 2022-08-29 RX ORDER — CYANOCOBALAMIN 1000 UG/ML
1000 INJECTION INTRAMUSCULAR; SUBCUTANEOUS ONCE
Status: COMPLETED | OUTPATIENT
Start: 2022-08-29 | End: 2022-08-29

## 2022-08-29 RX ADMIN — CYANOCOBALAMIN 1000 MCG: 1000 INJECTION INTRAMUSCULAR; SUBCUTANEOUS at 12:16:00

## 2022-09-08 ENCOUNTER — OFFICE VISIT (OUTPATIENT)
Dept: OBGYN CLINIC | Facility: CLINIC | Age: 47
End: 2022-09-08
Payer: COMMERCIAL

## 2022-09-08 VITALS
WEIGHT: 263.81 LBS | SYSTOLIC BLOOD PRESSURE: 138 MMHG | HEART RATE: 96 BPM | BODY MASS INDEX: 39.98 KG/M2 | DIASTOLIC BLOOD PRESSURE: 80 MMHG | HEIGHT: 68 IN

## 2022-09-08 DIAGNOSIS — Z12.4 CERVICAL CANCER SCREENING: ICD-10-CM

## 2022-09-08 DIAGNOSIS — Z01.419 ENCOUNTER FOR WELL WOMAN EXAM WITH ROUTINE GYNECOLOGICAL EXAM: Primary | ICD-10-CM

## 2022-09-08 PROBLEM — E61.1 DIETARY IRON DEFICIENCY WITHOUT ANEMIA: Status: RESOLVED | Noted: 2018-03-21 | Resolved: 2022-09-08

## 2022-09-08 PROBLEM — N92.4 EXCESSIVE BLEEDING IN PREMENOPAUSAL PERIOD: Status: RESOLVED | Noted: 2019-06-10 | Resolved: 2022-09-08

## 2022-09-08 PROBLEM — D50.0 IRON DEFICIENCY ANEMIA DUE TO CHRONIC BLOOD LOSS: Status: RESOLVED | Noted: 2019-06-10 | Resolved: 2022-09-08

## 2022-09-08 PROCEDURE — 3079F DIAST BP 80-89 MM HG: CPT | Performed by: OBSTETRICS & GYNECOLOGY

## 2022-09-08 PROCEDURE — 87624 HPV HI-RISK TYP POOLED RSLT: CPT | Performed by: OBSTETRICS & GYNECOLOGY

## 2022-09-08 PROCEDURE — 99396 PREV VISIT EST AGE 40-64: CPT | Performed by: OBSTETRICS & GYNECOLOGY

## 2022-09-08 PROCEDURE — 3075F SYST BP GE 130 - 139MM HG: CPT | Performed by: OBSTETRICS & GYNECOLOGY

## 2022-09-08 PROCEDURE — 3008F BODY MASS INDEX DOCD: CPT | Performed by: OBSTETRICS & GYNECOLOGY

## 2022-09-08 RX ORDER — MULTIVIT-MIN/IRON/FOLIC ACID/K 18-600-40
CAPSULE ORAL
COMMUNITY

## 2022-09-08 RX ORDER — UBIDECARENONE 75 MG
250 CAPSULE ORAL DAILY
COMMUNITY

## 2022-09-09 LAB — HPV I/H RISK 1 DNA SPEC QL NAA+PROBE: NEGATIVE

## 2022-10-21 ENCOUNTER — TELEPHONE (OUTPATIENT)
Dept: OBGYN CLINIC | Facility: CLINIC | Age: 47
End: 2022-10-21

## 2022-12-08 LAB — AMB EXT OCCULT BLOOD RESULT: NEGATIVE

## 2023-02-07 ENCOUNTER — PATIENT MESSAGE (OUTPATIENT)
Dept: FAMILY MEDICINE CLINIC | Facility: CLINIC | Age: 48
End: 2023-02-07

## 2023-02-09 ENCOUNTER — PATIENT MESSAGE (OUTPATIENT)
Dept: FAMILY MEDICINE CLINIC | Facility: CLINIC | Age: 48
End: 2023-02-09

## 2023-02-09 NOTE — TELEPHONE ENCOUNTER
Please update health maintenance to indicate that she had a negative fit test on 12/8/2022. Her next screening will be due 1 year from that date.

## 2023-02-18 ENCOUNTER — PATIENT MESSAGE (OUTPATIENT)
Dept: FAMILY MEDICINE CLINIC | Facility: CLINIC | Age: 48
End: 2023-02-18

## 2023-02-18 DIAGNOSIS — E03.9 ACQUIRED HYPOTHYROIDISM: Primary | ICD-10-CM

## 2023-02-20 RX ORDER — LEVOTHYROXINE SODIUM 0.05 MG/1
50 TABLET ORAL
Qty: 90 TABLET | Refills: 0 | Status: SHIPPED | OUTPATIENT
Start: 2023-02-20

## 2023-03-28 LAB
ABSOLUTE BASOPHILS: 21 CELLS/UL (ref 0–200)
ABSOLUTE EOSINOPHILS: 99 CELLS/UL (ref 15–500)
ABSOLUTE LYMPHOCYTES: 1352 CELLS/UL (ref 850–3900)
ABSOLUTE MONOCYTES: 359 CELLS/UL (ref 200–950)
ABSOLUTE NEUTROPHILS: 3370 CELLS/UL (ref 1500–7800)
ALBUMIN/GLOBULIN RATIO: 1.7 (CALC) (ref 1–2.5)
ALBUMIN: 4.3 G/DL (ref 3.6–5.1)
ALKALINE PHOSPHATASE: 60 U/L (ref 31–125)
ALT: 9 U/L (ref 6–29)
AST: 12 U/L (ref 10–35)
BASOPHILS: 0.4 %
BILIRUBIN, TOTAL: 0.4 MG/DL (ref 0.2–1.2)
BUN: 17 MG/DL (ref 7–25)
CALCIUM: 9.1 MG/DL (ref 8.6–10.2)
CARBON DIOXIDE: 27 MMOL/L (ref 20–32)
CHLORIDE: 104 MMOL/L (ref 98–110)
CHOL/HDLC RATIO: 3.7 (CALC)
CHOLESTEROL, TOTAL: 244 MG/DL
CREATININE: 0.86 MG/DL (ref 0.5–0.99)
EGFR: 83 ML/MIN/1.73M2
EOSINOPHILS: 1.9 %
GLOBULIN: 2.6 G/DL (CALC) (ref 1.9–3.7)
GLUCOSE: 88 MG/DL (ref 65–99)
HDL CHOLESTEROL: 66 MG/DL
HEMATOCRIT: 42.7 % (ref 35–45)
HEMOGLOBIN A1C: 5.9 % OF TOTAL HGB
HEMOGLOBIN: 14.1 G/DL (ref 11.7–15.5)
LDL-CHOLESTEROL: 158 MG/DL (CALC)
LYMPHOCYTES: 26 %
MCH: 28.9 PG (ref 27–33)
MCHC: 33 G/DL (ref 32–36)
MCV: 87.5 FL (ref 80–100)
MONOCYTES: 6.9 %
MPV: 10.2 FL (ref 7.5–12.5)
NEUTROPHILS: 64.8 %
NON-HDL CHOLESTEROL: 178 MG/DL (CALC)
PLATELET COUNT: 302 THOUSAND/UL (ref 140–400)
POTASSIUM: 3.9 MMOL/L (ref 3.5–5.3)
PROTEIN, TOTAL: 6.9 G/DL (ref 6.1–8.1)
RDW: 13.1 % (ref 11–15)
RED BLOOD CELL COUNT: 4.88 MILLION/UL (ref 3.8–5.1)
SODIUM: 138 MMOL/L (ref 135–146)
TRIGLYCERIDES: 95 MG/DL
TSH W/REFLEX TO FT4: 2.96 MIU/L
VITAMIN B12: 295 PG/ML (ref 200–1100)
VITAMIN D, 25-OH, TOTAL: 23 NG/ML (ref 30–100)
WHITE BLOOD CELL COUNT: 5.2 THOUSAND/UL (ref 3.8–10.8)

## 2023-03-30 ENCOUNTER — OFFICE VISIT (OUTPATIENT)
Dept: FAMILY MEDICINE CLINIC | Facility: CLINIC | Age: 48
End: 2023-03-30
Payer: COMMERCIAL

## 2023-03-30 VITALS
OXYGEN SATURATION: 99 % | SYSTOLIC BLOOD PRESSURE: 130 MMHG | DIASTOLIC BLOOD PRESSURE: 72 MMHG | RESPIRATION RATE: 16 BRPM | HEART RATE: 97 BPM | TEMPERATURE: 97 F | WEIGHT: 276.13 LBS | BODY MASS INDEX: 41.85 KG/M2 | HEIGHT: 68 IN

## 2023-03-30 DIAGNOSIS — E55.9 VITAMIN D DEFICIENCY: ICD-10-CM

## 2023-03-30 DIAGNOSIS — K64.4 EXTERNAL HEMORRHOID: ICD-10-CM

## 2023-03-30 DIAGNOSIS — E78.2 MIXED HYPERLIPIDEMIA: ICD-10-CM

## 2023-03-30 DIAGNOSIS — Z12.11 COLON CANCER SCREENING: ICD-10-CM

## 2023-03-30 DIAGNOSIS — R73.03 PREDIABETES: ICD-10-CM

## 2023-03-30 DIAGNOSIS — Z00.00 ANNUAL PHYSICAL EXAM: Primary | ICD-10-CM

## 2023-03-30 DIAGNOSIS — E53.8 LOW VITAMIN B12 LEVEL: ICD-10-CM

## 2023-03-30 DIAGNOSIS — E03.9 ACQUIRED HYPOTHYROIDISM: ICD-10-CM

## 2023-03-30 DIAGNOSIS — E55.9 HYPOVITAMINOSIS D: ICD-10-CM

## 2023-03-30 DIAGNOSIS — I49.3 PVC (PREMATURE VENTRICULAR CONTRACTION): ICD-10-CM

## 2023-03-30 PROBLEM — E66.01 MORBID (SEVERE) OBESITY DUE TO EXCESS CALORIES (HCC): Status: ACTIVE | Noted: 2023-03-30

## 2023-03-30 PROCEDURE — 3078F DIAST BP <80 MM HG: CPT | Performed by: FAMILY MEDICINE

## 2023-03-30 PROCEDURE — 99396 PREV VISIT EST AGE 40-64: CPT | Performed by: FAMILY MEDICINE

## 2023-03-30 PROCEDURE — 96372 THER/PROPH/DIAG INJ SC/IM: CPT | Performed by: FAMILY MEDICINE

## 2023-03-30 PROCEDURE — 99213 OFFICE O/P EST LOW 20 MIN: CPT | Performed by: FAMILY MEDICINE

## 2023-03-30 PROCEDURE — 3075F SYST BP GE 130 - 139MM HG: CPT | Performed by: FAMILY MEDICINE

## 2023-03-30 PROCEDURE — 3008F BODY MASS INDEX DOCD: CPT | Performed by: FAMILY MEDICINE

## 2023-03-30 RX ORDER — LEVOTHYROXINE SODIUM 0.05 MG/1
50 TABLET ORAL
Qty: 90 TABLET | Refills: 3 | Status: SHIPPED | OUTPATIENT
Start: 2023-03-30

## 2023-03-30 RX ORDER — HYDROCORTISONE 25 MG/G
1 CREAM TOPICAL 2 TIMES DAILY
Qty: 28 G | Refills: 0 | Status: SHIPPED | OUTPATIENT
Start: 2023-03-30 | End: 2023-04-09

## 2023-03-30 RX ORDER — CYANOCOBALAMIN 1000 UG/ML
1000 INJECTION, SOLUTION INTRAMUSCULAR; SUBCUTANEOUS
Status: SHIPPED | OUTPATIENT
Start: 2023-03-30 | End: 2023-06-28

## 2023-03-30 RX ORDER — CYANOCOBALAMIN 1000 UG/ML
1000 INJECTION, SOLUTION INTRAMUSCULAR; SUBCUTANEOUS
Status: CANCELLED | OUTPATIENT
Start: 2023-03-30

## 2023-03-30 RX ORDER — ERGOCALCIFEROL 1.25 MG/1
50000 CAPSULE ORAL WEEKLY
Qty: 12 CAPSULE | Refills: 0 | Status: SHIPPED | OUTPATIENT
Start: 2023-03-30 | End: 2023-06-28

## 2023-03-30 RX ADMIN — CYANOCOBALAMIN 1000 MCG: 1000 INJECTION, SOLUTION INTRAMUSCULAR; SUBCUTANEOUS at 11:34:00

## 2023-08-21 ENCOUNTER — ORDER TRANSCRIPTION (OUTPATIENT)
Dept: ADMINISTRATIVE | Facility: HOSPITAL | Age: 48
End: 2023-08-21

## 2023-08-21 DIAGNOSIS — Z12.31 ENCOUNTER FOR SCREENING MAMMOGRAM FOR MALIGNANT NEOPLASM OF BREAST: Primary | ICD-10-CM

## 2023-09-07 ENCOUNTER — HOSPITAL ENCOUNTER (OUTPATIENT)
Dept: MAMMOGRAPHY | Age: 48
Discharge: HOME | End: 2023-09-07
Attending: FAMILY MEDICINE
Payer: COMMERCIAL

## 2023-09-07 DIAGNOSIS — Z12.31 ENCOUNTER FOR SCREENING MAMMOGRAM FOR MALIGNANT NEOPLASM OF BREAST: ICD-10-CM

## 2023-09-07 PROCEDURE — 77063 BREAST TOMOSYNTHESIS BI: CPT | Performed by: FAMILY MEDICINE

## 2023-09-07 PROCEDURE — 77067 SCR MAMMO BI INCL CAD: CPT | Performed by: FAMILY MEDICINE

## 2023-09-21 ENCOUNTER — NURSE ONLY (OUTPATIENT)
Dept: FAMILY MEDICINE CLINIC | Facility: CLINIC | Age: 48
End: 2023-09-21
Payer: COMMERCIAL

## 2023-09-21 ENCOUNTER — TELEPHONE (OUTPATIENT)
Dept: FAMILY MEDICINE CLINIC | Facility: CLINIC | Age: 48
End: 2023-09-21

## 2023-09-21 DIAGNOSIS — E53.8 LOW VITAMIN B12 LEVEL: Primary | ICD-10-CM

## 2023-09-21 PROCEDURE — 96372 THER/PROPH/DIAG INJ SC/IM: CPT | Performed by: FAMILY MEDICINE

## 2023-09-21 RX ORDER — CYANOCOBALAMIN 1000 UG/ML
1000 INJECTION, SOLUTION INTRAMUSCULAR; SUBCUTANEOUS ONCE
Status: COMPLETED | OUTPATIENT
Start: 2023-09-21 | End: 2023-09-21

## 2023-09-21 RX ADMIN — CYANOCOBALAMIN 1000 MCG: 1000 INJECTION, SOLUTION INTRAMUSCULAR; SUBCUTANEOUS at 11:12:00

## 2023-09-21 NOTE — PROGRESS NOTES
Herman Taylor presents today for nurse visit. B12 shot given in right deltoid and tolerated, no adverse reaction. Patient tolerated well. Left office in stable condition.

## 2023-09-21 NOTE — TELEPHONE ENCOUNTER
Pt. came in today for her B12 shot. .  She was seen by you 3-30-23 at annual physical exam (received B12 shot at appt.) and in your note it says 3 additional shots every 30 days. .unfortunately she never did f/u. .. So today she came in and got her shot. ...and she said you had told her she needed to get monthly B12 shot for 6 months?    Please clarify ,  Thanks,  Nataly Bailon

## 2023-09-28 ENCOUNTER — OFFICE VISIT (OUTPATIENT)
Facility: CLINIC | Age: 48
End: 2023-09-28
Payer: COMMERCIAL

## 2023-09-28 VITALS
DIASTOLIC BLOOD PRESSURE: 100 MMHG | HEIGHT: 68 IN | BODY MASS INDEX: 42.01 KG/M2 | HEART RATE: 93 BPM | WEIGHT: 277.19 LBS | SYSTOLIC BLOOD PRESSURE: 154 MMHG

## 2023-09-28 DIAGNOSIS — Z12.4 CERVICAL CANCER SCREENING: ICD-10-CM

## 2023-09-28 DIAGNOSIS — Z01.419 ENCOUNTER FOR WELL WOMAN EXAM WITH ROUTINE GYNECOLOGICAL EXAM: Primary | ICD-10-CM

## 2023-09-28 PROCEDURE — 87624 HPV HI-RISK TYP POOLED RSLT: CPT | Performed by: OBSTETRICS & GYNECOLOGY

## 2023-09-28 PROCEDURE — 3008F BODY MASS INDEX DOCD: CPT | Performed by: OBSTETRICS & GYNECOLOGY

## 2023-09-28 PROCEDURE — 99396 PREV VISIT EST AGE 40-64: CPT | Performed by: OBSTETRICS & GYNECOLOGY

## 2023-09-28 PROCEDURE — 3080F DIAST BP >= 90 MM HG: CPT | Performed by: OBSTETRICS & GYNECOLOGY

## 2023-09-28 PROCEDURE — 3077F SYST BP >= 140 MM HG: CPT | Performed by: OBSTETRICS & GYNECOLOGY

## 2023-09-28 PROCEDURE — 88175 CYTOPATH C/V AUTO FLUID REDO: CPT | Performed by: OBSTETRICS & GYNECOLOGY

## 2023-09-28 NOTE — PROGRESS NOTES
Leelee Kimble is a 49year old female  Patient's last menstrual period was 2023 (exact date). Patient presents with:  Wellness Visit: Last pap 22 neg   Other: Pt declined flu shot  .   Patient c/o vulvar irritation after switching soap  OBSTETRICS HISTORY:  OB History    Para Term  AB Living   5 3 2         SAB IAB Ectopic Multiple Live Births           3      # Outcome Date GA Lbr Luiz/2nd Weight Sex Delivery Anes PTL Lv   5 Term 09    F Caesarean      4 Para 05    M Caesarean      3 Term 09/15/03    M Caesarean      2             1                Obstetric Comments   miscarriage       GYNE HISTORY:  Periods regular monthly      Sexual activity:   Yes      Partners:   Male      Birth control/ protection:   Vasectomy        Hx Prior Abnormal Pap:  (20+ years ago)  Pap Date: 22  Pap Result Notes: neg        MEDICAL HISTORY:  Past Medical History:   Diagnosis Date    Abnormal stress test 2018    Acquired hypothyroidism 3/19/2018    IUD (intrauterine device) in place 10/19/2015    Paragard 2014    Mixed hyperlipidemia 2013    Non-sustained ventricular tachycardia ([address] 2017    Obesity 2012    PVC (premature ventricular contraction) 10/5/2017       SURGICAL HISTORY:  Past Surgical History:   Procedure Laterality Date      , ,     Cyst removal      Insert intrauterine device      Remove intrauterine device  2020       SOCIAL HISTORY:  Social History    Socioeconomic History      Marital status:       Spouse name: Not on file      Number of children: 3      Years of education: Not on file      Highest education level: Not on file    Occupational History      Occupation: DENTAL HYGENTIST        Employer: [employer]    Tobacco Use      Smoking status: Never      Smokeless tobacco: Never    Vaping Use      Vaping Use: Never used    Substance and Sexual Activity      Alcohol use: Not Currently      Drug use: No      Sexual activity: Yes        Partners: Male        Birth control/protection: Vasectomy    Other Topics      Concerns:         Service: Not Asked        Blood Transfusions: Not Asked        Caffeine Concern: No        Occupational Exposure: Not Asked        Hobby Hazards: Not Asked        Sleep Concern: Not Asked        Stress Concern: No        Weight Concern: Yes        Special Diet: No        Back Care: Not Asked        Exercise: No        Bike Helmet: Not Asked        Seat Belt: Yes        Self-Exams: Not Asked    Social History Narrative      Not on file    Social Determinants of Health  Financial Resource Strain: Not on file  Food Insecurity: Not on file  Transportation Needs: Not on file  Physical Activity: Not on file  Stress: Not on file  Social Connections: Not on file  Housing Stability: Not on file    FAMILY HISTORY:  Family History   Problem Relation Age of Onset    Lipids Other         Family History of     Lipids Mother         unknown    Lipids Father         unknown    Hypertension Father         unknown    Heart Attack Maternal Grandmother         age of death 67    Other (Brain Aneurysm) Paternal Grandmother         age of death 76    Breast Cancer Neg     Colon Cancer Neg        MEDICATIONS:    Current Outpatient Medications:     levothyroxine 50 MCG Oral Tab, Take 1 tablet (50 mcg total) by mouth before breakfast., Disp: 90 tablet, Rfl: 3    ALLERGIES:  No Known Allergies      Review of Systems:  Constitutional:  Denies fatigue, night sweats, hot flashes  Eyes:  denies blurred or double vision  Cardiovascular:  denies chest pain or palpitations  Respiratory:  denies shortness of breath  Gastrointestinal:  denies heartburn, abdominal pain, diarrhea or constipation  Genitourinary:  denies dysuria, incontinence, abnormal vaginal discharge, vaginal itching  Musculoskeletal:  denies back pain. Skin/Breast:  Denies any breast pain, lumps, or discharge.    Neurological:  denies headaches, extremity weakness or numbness. Psychiatric: denies depression or anxiety. Endocrine:   denies excessive thirst or urination. Heme/Lymph:  denies history of anemia, easy bruising or bleeding. PHYSICAL EXAM:   Constitutional: well developed, well nourished  Head/Face: normocephalic  Neck/Thyroid: thyroid symmetric, no thyromegaly, no nodules, no adenopathy  Lymphatic:no abnormal supraclavicular or axillary adenopathy is noted  Breast: normal without palpable masses, tenderness, asymmetry, nipple discharge, nipple retraction or skin changes  Abdomen:  soft, nontender, nondistended, no masses  Skin/Hair: no unusual rashes or bruises  Extremities: no edema, no cyanosis  Psychiatric:  Oriented to time, place, person and situation.  Appropriate mood and affect    Pelvic Exam:  External Genitalia: normal appearance, hair distribution, and no lesions  Urethral Meatus:  normal in size, location, without lesions and prolapse  Bladder:  No fullness, masses or tenderness  Vagina:  Normal appearance without lesions, no abnormal discharge  Cervix:  Normal without tenderness on motion  Uterus: normal in size, contour, position, mobility, without tenderness  Adnexa: normal without masses or tenderness  Perineum: normal  Anus: no hemorroids     Assessment & Plan:  Diagnoses and all orders for this visit:    Encounter for well woman exam with routine gynecological exam    Cervical cancer screening  -     Hpv High Risk , Thin Prep Collect  -     ThinPrep PAP Smear B

## 2023-09-29 LAB — HPV I/H RISK 1 DNA SPEC QL NAA+PROBE: NEGATIVE

## 2023-10-23 ENCOUNTER — NURSE ONLY (OUTPATIENT)
Dept: FAMILY MEDICINE CLINIC | Facility: CLINIC | Age: 48
End: 2023-10-23
Payer: COMMERCIAL

## 2023-10-23 DIAGNOSIS — R79.89 LOW VITAMIN B12 LEVEL: Primary | ICD-10-CM

## 2023-10-23 RX ORDER — CYANOCOBALAMIN 1000 UG/ML
1000 INJECTION, SOLUTION INTRAMUSCULAR; SUBCUTANEOUS ONCE
Status: COMPLETED | OUTPATIENT
Start: 2023-10-23 | End: 2023-10-23

## 2023-10-23 RX ADMIN — CYANOCOBALAMIN 1000 MCG: 1000 INJECTION, SOLUTION INTRAMUSCULAR; SUBCUTANEOUS at 13:15:00

## 2023-10-23 NOTE — PROGRESS NOTES
Patient is here for her Vitamin B12 injection. Given in left deltoid, no adverse reaction noted.  Will return next month

## 2023-12-04 ENCOUNTER — NURSE ONLY (OUTPATIENT)
Dept: FAMILY MEDICINE CLINIC | Facility: CLINIC | Age: 48
End: 2023-12-04
Payer: COMMERCIAL

## 2023-12-04 DIAGNOSIS — E53.8 B12 DEFICIENCY: Primary | ICD-10-CM

## 2023-12-04 RX ORDER — CYANOCOBALAMIN 1000 UG/ML
1000 INJECTION, SOLUTION INTRAMUSCULAR; SUBCUTANEOUS ONCE
Status: COMPLETED | OUTPATIENT
Start: 2023-12-04 | End: 2023-12-04

## 2023-12-04 RX ADMIN — CYANOCOBALAMIN 1000 MCG: 1000 INJECTION, SOLUTION INTRAMUSCULAR; SUBCUTANEOUS at 13:46:00

## 2023-12-04 NOTE — PROGRESS NOTES
Jr Mcghee presents today for nurse visit. B12 given IM in right deltoid. Patient tolerated well. Left office in stable condition.

## 2024-02-22 PROBLEM — Z12.11 SPECIAL SCREENING FOR MALIGNANT NEOPLASM OF COLON: Status: ACTIVE | Noted: 2024-02-22

## 2024-05-21 DIAGNOSIS — E03.9 ACQUIRED HYPOTHYROIDISM: ICD-10-CM

## 2024-05-21 RX ORDER — LEVOTHYROXINE SODIUM 50 UG/1
50 TABLET ORAL
Qty: 30 TABLET | Refills: 0 | Status: SHIPPED | OUTPATIENT
Start: 2024-05-21 | End: 2024-06-21

## 2024-05-21 NOTE — TELEPHONE ENCOUNTER
Medication Quantity Refills Start End   levothyroxine 50 MCG Oral Tab 90 tablet 3 3/30/2023 --   Sig:   Take 1 tablet (50 mcg total) by mouth before breakfast.     Route:   Oral     Order #:   [id number]       Last OV 3/30/23 for physical  No future appointments.     Thyroid Medication Protocol [id number]/21/2024 01:31 AM   Protocol Details TSH in past 12 months    In person appointment or virtual visit in the past 12 mos or appointment in next 3 mos    Last TSH value is normal        RX sent for 30- overdue for labs and physical.   Please schedule

## 2024-05-22 ENCOUNTER — PATIENT MESSAGE (OUTPATIENT)
Dept: FAMILY MEDICINE CLINIC | Facility: CLINIC | Age: 49
End: 2024-05-22

## 2024-05-22 NOTE — TELEPHONE ENCOUNTER
Sent MyChart for patient that medication refilled for 30 days and will need appointment for physical and fasting labs done for further refills. Please place lab orders in system.

## 2024-05-23 ENCOUNTER — TELEPHONE (OUTPATIENT)
Dept: FAMILY MEDICINE CLINIC | Facility: CLINIC | Age: 49
End: 2024-05-23

## 2024-05-23 DIAGNOSIS — Z00.00 LABORATORY EXAMINATION ORDERED AS PART OF A ROUTINE GENERAL MEDICAL EXAMINATION: Primary | ICD-10-CM

## 2024-05-23 DIAGNOSIS — E53.8 B12 DEFICIENCY: ICD-10-CM

## 2024-05-23 DIAGNOSIS — E55.9 HYPOVITAMINOSIS D: ICD-10-CM

## 2024-05-23 DIAGNOSIS — R73.03 PREDIABETES: ICD-10-CM

## 2024-05-23 NOTE — TELEPHONE ENCOUNTER
From: Aubrie BAKER  To: Ene Tan  Sent: 5/22/2024 8:42 AM CDT  Subject: Refill Request/Appointment & Labs    Good morning, Ene.  We received a refill request from your pharmacy for your medication. Dr. Tripathi has refilled it for 30 days, but will need you to schedule an appointment for your annual wellness exam and have fasting labs done for further refills. Please either call the office at [phone] to schedule, or you can schedule via [phone].  Thank you,  Aubrie, [id number]  Patient Services Representative Lead

## 2024-05-23 NOTE — TELEPHONE ENCOUNTER
Patient would like to have lab orders placed in the system so that she can have them done prior to her upcoming annual wellness exam.  Future Appointments   Date Time Provider Department Center   7/18/2024  9:00 AM Marcial Tripathi DO [facility] [address]   8/29/2024 12:00 PM Eloise Nam DO [facility] [place]

## 2024-06-19 DIAGNOSIS — E03.9 ACQUIRED HYPOTHYROIDISM: ICD-10-CM

## 2024-06-21 RX ORDER — LEVOTHYROXINE SODIUM 0.05 MG/1
50 TABLET ORAL
Qty: 30 TABLET | Refills: 0 | Status: SHIPPED | OUTPATIENT
Start: 2024-06-21

## 2024-06-21 NOTE — TELEPHONE ENCOUNTER
Requesting Levothyroxine 50mcg   LOV: 3/30/23 Physical  RTC: 1 year  Last Relevant Labs: 3/27/23  Filled: 5/21/24 #30 with 0 refills    Future Appointments   Date Time Provider Department Center   7/18/2024  9:00 AM Marcial Tripathi DO [facility] [address]   8/29/2024 12:00 PM Eloise Nam DO [facility] [place]     Thyroid Medication Protocol [id number]/19/2024 09:57 AM   Protocol Details TSH in past 12 months    Last TSH value is normal    In person appointment or virtual visit in the past 12 mos or appointment in next 3 mos     Patient has upcoming appt and will be completing labs prior. Rx sent for #30

## 2024-07-09 ENCOUNTER — PATIENT MESSAGE (OUTPATIENT)
Dept: FAMILY MEDICINE CLINIC | Facility: CLINIC | Age: 49
End: 2024-07-09

## 2024-07-09 NOTE — TELEPHONE ENCOUNTER
From: Ene Tan  To: Marcial Tripathi  Sent: 7/9/2024 6:52 AM CDT  Subject: Labs     Just wanted to double check that Vit D, B-12 and iron is ordered for my labs. Going Thursday.   Thanks!

## 2024-07-12 LAB
ABSOLUTE BASOPHILS: 10 CELLS/UL (ref 0–200)
ABSOLUTE EOSINOPHILS: 78 CELLS/UL (ref 15–500)
ABSOLUTE LYMPHOCYTES: 1166 CELLS/UL (ref 850–3900)
ABSOLUTE MONOCYTES: 299 CELLS/UL (ref 200–950)
ABSOLUTE NEUTROPHILS: 3347 CELLS/UL (ref 1500–7800)
ALBUMIN/GLOBULIN RATIO: 1.6 (CALC) (ref 1–2.5)
ALBUMIN: 4.2 G/DL (ref 3.6–5.1)
ALKALINE PHOSPHATASE: 67 U/L (ref 31–125)
ALT: 13 U/L (ref 6–29)
AST: 17 U/L (ref 10–35)
BASOPHILS: 0.2 %
BILIRUBIN, TOTAL: 0.6 MG/DL (ref 0.2–1.2)
BUN: 9 MG/DL (ref 7–25)
CALCIUM: 9.2 MG/DL (ref 8.6–10.2)
CARBON DIOXIDE: 29 MMOL/L (ref 20–32)
CHLORIDE: 101 MMOL/L (ref 98–110)
CHOL/HDLC RATIO: 3.8 (CALC)
CHOLESTEROL, TOTAL: 248 MG/DL
CREATININE: 0.81 MG/DL (ref 0.5–0.99)
EGFR: 89 ML/MIN/1.73M2
EOSINOPHILS: 1.6 %
GLOBULIN: 2.6 G/DL (CALC) (ref 1.9–3.7)
GLUCOSE: 93 MG/DL (ref 65–99)
HDL CHOLESTEROL: 66 MG/DL
HEMATOCRIT: 42.1 % (ref 35–45)
HEMOGLOBIN A1C: 6.2 % OF TOTAL HGB
HEMOGLOBIN: 13.7 G/DL (ref 11.7–15.5)
LDL-CHOLESTEROL: 163 MG/DL (CALC)
LYMPHOCYTES: 23.8 %
MCH: 28.3 PG (ref 27–33)
MCHC: 32.5 G/DL (ref 32–36)
MCV: 87 FL (ref 80–100)
MONOCYTES: 6.1 %
MPV: 9.5 FL (ref 7.5–12.5)
NEUTROPHILS: 68.3 %
NON-HDL CHOLESTEROL: 182 MG/DL (CALC)
PLATELET COUNT: 330 THOUSAND/UL (ref 140–400)
POTASSIUM: 4.4 MMOL/L (ref 3.5–5.3)
PROTEIN, TOTAL: 6.8 G/DL (ref 6.1–8.1)
RDW: 13.6 % (ref 11–15)
RED BLOOD CELL COUNT: 4.84 MILLION/UL (ref 3.8–5.1)
SODIUM: 137 MMOL/L (ref 135–146)
TRIGLYCERIDES: 84 MG/DL
TSH W/REFLEX TO FT4: 2.39 MIU/L
VITAMIN B12: 340 PG/ML (ref 200–1100)
VITAMIN D, 25-OH, TOTAL: 25 NG/ML (ref 30–100)
WHITE BLOOD CELL COUNT: 4.9 THOUSAND/UL (ref 3.8–10.8)

## 2024-07-18 ENCOUNTER — OFFICE VISIT (OUTPATIENT)
Dept: FAMILY MEDICINE CLINIC | Facility: CLINIC | Age: 49
End: 2024-07-18
Payer: COMMERCIAL

## 2024-07-18 VITALS
DIASTOLIC BLOOD PRESSURE: 98 MMHG | HEIGHT: 68 IN | WEIGHT: 281 LBS | BODY MASS INDEX: 42.59 KG/M2 | RESPIRATION RATE: 16 BRPM | TEMPERATURE: 98 F | SYSTOLIC BLOOD PRESSURE: 150 MMHG | HEART RATE: 92 BPM

## 2024-07-18 DIAGNOSIS — E78.2 MIXED HYPERLIPIDEMIA: ICD-10-CM

## 2024-07-18 DIAGNOSIS — Z12.31 BREAST CANCER SCREENING BY MAMMOGRAM: ICD-10-CM

## 2024-07-18 DIAGNOSIS — E78.00 HIGH CHOLESTEROL: ICD-10-CM

## 2024-07-18 DIAGNOSIS — E55.9 VITAMIN D DEFICIENCY: ICD-10-CM

## 2024-07-18 DIAGNOSIS — R03.0 ELEVATED BLOOD PRESSURE READING: ICD-10-CM

## 2024-07-18 DIAGNOSIS — I49.3 PVC (PREMATURE VENTRICULAR CONTRACTION): ICD-10-CM

## 2024-07-18 DIAGNOSIS — I10 ESSENTIAL HYPERTENSION: ICD-10-CM

## 2024-07-18 DIAGNOSIS — I47.29 NON-SUSTAINED VENTRICULAR TACHYCARDIA (HCC): ICD-10-CM

## 2024-07-18 DIAGNOSIS — E66.01 MORBID (SEVERE) OBESITY DUE TO EXCESS CALORIES (HCC): ICD-10-CM

## 2024-07-18 DIAGNOSIS — Z00.00 WELL ADULT EXAM: Primary | ICD-10-CM

## 2024-07-18 DIAGNOSIS — E03.9 ACQUIRED HYPOTHYROIDISM: ICD-10-CM

## 2024-07-18 DIAGNOSIS — F41.9 ANXIETY: ICD-10-CM

## 2024-07-18 PROCEDURE — 99214 OFFICE O/P EST MOD 30 MIN: CPT | Performed by: FAMILY MEDICINE

## 2024-07-18 PROCEDURE — 99396 PREV VISIT EST AGE 40-64: CPT | Performed by: FAMILY MEDICINE

## 2024-07-18 RX ORDER — LEVOTHYROXINE SODIUM 0.05 MG/1
50 TABLET ORAL
Qty: 90 TABLET | Refills: 3 | Status: SHIPPED | OUTPATIENT
Start: 2024-07-18 | End: 2025-07-13

## 2024-07-18 RX ORDER — LOSARTAN POTASSIUM 50 MG/1
50 TABLET ORAL DAILY
Qty: 30 TABLET | Refills: 0 | Status: SHIPPED | OUTPATIENT
Start: 2024-07-18 | End: 2024-08-17

## 2024-07-18 RX ORDER — BUPROPION HYDROCHLORIDE 150 MG/1
150 TABLET ORAL DAILY
Qty: 30 TABLET | Refills: 0 | Status: SHIPPED | OUTPATIENT
Start: 2024-07-18

## 2024-07-18 NOTE — PROGRESS NOTES
Note to patient: The 21st Century Cures Act makes medical notes like these available to patients in the interest of transparency. However, be advised this is a medical document. It is intended as peer to peer communication. It is written in medical language and may contain abbreviations or verbiage that are unfamiliar. It may appear blunt or direct. Medical documents are intended to carry relevant information, facts as evident, and the clinical opinion of the practitioner.         Chief Complaint   Patient presents with    Physical       HPI:  Patient is here for her physical    Patient had labs completed last week.  LDL was elevated at 163.  Triglycerides and HDL WNL.  A1c 6.2 consistent with prediabetes.  B12 level is normal.  Vitamin D level is low at 25.  CMP and CBC are unremarkable/normal.  Thyroid test is also normal.    Patient has a history of hypothyroidism.  She is currently taking levothyroxine 50 mcg daily.  No side effects to medication.  TSH at goal.    Both parents and siblings have high cholesterol and are on medication.     She has hx of PVCs. She does not get palpitations regularly. Has seen cardiology. She has had testing done - no sustained symptoms.     BP at work with wrist cuff is 130s'80s.   She does have baseline anxiety- stable.     BP- elevated today. She reports that if she is stressed she notices that BP will go up > 140. She does have anxiety.  Stressful situations worsen her anxiety. She does not want to be on daily medication. She is amenable to taking PRN medication. Son is in college football and that creates a lot of stress for her. She is open to starting wellbutrin.     Last A1c value was 6.2% done 7/11/2024. She is not watching her diet or exercising. She wants to see weight loss clinic. She does not eat out much. Not counting calories. Reports that she eats a lot of sweets/carbs. She has healthy food options but snacking and sweets are her weakness.  30 day holter 2017- PACs.      Smoking: none  Alcohol: occasionally   Drugs: none   Sexual hx: 1 partner   STD hx: declined  Occupation: dental hygienist.  Mammogram: Up-to-date in September 2023, BI-RADS 1.  Colonoscopy: Colonoscopy completed in February 2024, normal, next due in 10 years.  Pap smear: Pap smear completed in 2023 with gynecology.  Unremarkable.  Tdap: 2019       The 10-year ASCVD risk score (Oseas CARPIO, et al., 2019) is: 1.6%    Values used to calculate the score:      Age: 48 years      Sex: Female      Is Non- : No      Diabetic: No      Tobacco smoker: No      Systolic Blood Pressure: 150 mmHg      Is BP treated: No      HDL Cholesterol: 66 mg/dL      Total Cholesterol: 248 mg/dL    Review of Systems   Constitutional: Negative for fever, chills and fatigue. No distress.  HENT: Negative for hearing loss, congestion, sore throat, neck pain   Eyes: Negative for pain and visual disturbance.   Respiratory: Negative for cough, chest tightness, shortness of breath and wheezing.    Cardiovascular: Negative for chest pain, palpitations and leg swelling.   Gastrointestinal: Negative for nausea, vomiting, abdominal pain, diarrhea, blood in stool       Patient Active Problem List   Diagnosis    Obesity    Vitamin D deficiency    Low vitamin B12 level    Mixed hyperlipidemia    PVC (premature ventricular contraction)    Non-sustained ventricular tachycardia (HCC)    Abnormal stress test    Acquired hypothyroidism    Chronic fatigue    Prediabetes    Low ferritin    Morbid (severe) obesity due to excess calories (HCC)    Body mass index (BMI) 40.0-44.9, adult (HCC)    External hemorrhoid    Special screening for malignant neoplasm of colon       Past Medical History:    Abnormal stress test    Acquired hypothyroidism    Body piercing    Heart palpitations    Hemorrhoids    IUD (intrauterine device) in place    Paragard 2014    Mixed hyperlipidemia    Non-sustained ventricular tachycardia (HCC)    Obesity    PVC  (premature ventricular contraction)    Stress    Wears glasses     Past Surgical History:   Procedure Laterality Date      , , 2009    Cyst removal      Insert intrauterine device            Remove intrauterine device  2020     Family History   Problem Relation Age of Onset    Lipids Other         Family History of     Lipids Mother         unknown    Lipids Father         unknown    Hypertension Father         unknown    Heart Attack Maternal Grandmother         age of death 72    Other (Brain Aneurysm) Paternal Grandmother         age of death 75    Breast Cancer Neg     Colon Cancer Neg      Social History     Socioeconomic History    Marital status:     Number of children: 3   Occupational History    Occupation: DENTAL HYGENTIST     Employer: [employer]   Tobacco Use    Smoking status: Never     Passive exposure: Never    Smokeless tobacco: Never   Vaping Use    Vaping status: Never Used   Substance and Sexual Activity    Alcohol use: Not Currently    Drug use: No    Sexual activity: Yes     Partners: Male     Birth control/protection: Vasectomy   Other Topics Concern    Caffeine Concern No    Stress Concern No    Weight Concern Yes    Special Diet No    Exercise No    Seat Belt Yes       Current Outpatient Medications   Medication Sig Dispense Refill    levothyroxine 50 MCG Oral Tab Take 1 tablet (50 mcg total) by mouth before breakfast. 90 tablet 3    buPROPion  MG Oral Tablet 24 Hr Take 1 tablet (150 mg total) by mouth daily. 30 tablet 0    losartan 50 MG Oral Tab Take 1 tablet (50 mg total) by mouth daily for 30 doses. 30 tablet 0       Allergies  No Known Allergies    Health Maintenance  Immunizations:  Immunization History   Administered Date(s) Administered    >=3 YRS FLUZONE OR FLUARIX QUAD PRESERVE FREE SINGLE DOSE (18581) FLU CLINIC 10/19/2015    Covid-19 Vaccine Moderna 100 mcg/0.5 ml 2021, 2021    Covid-19 Vaccine Pfizer 30 mcg/0.3 ml 2022     FLULAVAL 6 months & older 0.5 ml Prefilled syringe (16902) 09/19/2017    FLUZONE 6 months and older PFS 0.5 ml (12643) 11/07/2016, 11/13/2022    Flublok Quad Influenza Vaccine (47958) 11/22/2021    Influenza 10/27/2011, 10/06/2014, 12/01/2018, 10/28/2020    TDAP 05/07/2019         Physical Exam  BP (!) 150/98   Pulse 92   Temp 98 °F (36.7 °C) (Temporal)   Resp 16   Ht 5' 8\" (1.727 m)   Wt 281 lb (127.5 kg)   LMP 07/05/2024 (Exact Date)   BMI 42.73 kg/m²   Constitutional: Oriented to person, place, and time. No distress.   HEENT:  Normocephalic and atraumatic. Hearing and tympanic membranes normal.  Nose normal. Oropharynx is clear and moist.   Eyes: Conjunctivae and EOM are normal. PERRLA. No scleral icterus.   Neck: Normal range of motion. Neck supple. No mass and no thyromegaly present.   Cardiovascular: Normal rate, regular rhythm and intact distal pulses.  No murmur, rubs or gallops.   Pulmonary/Chest: Effort normal and breath sounds normal. No respiratory distress. No wheezes, rhonchi or rales  Abdominal: Soft. Bowel sounds are normal. Non tender, no masses, no organomegaly or hernias.  Rectal: external nonbleeding, nonthrombosed hemorrhoids.   Skin: Skin is warm and dry. No rash noted. No erythema. No pallor.   Psychiatric: Normal mood and affect.     A/P:    Encounter Diagnoses   Name Primary?    Breast cancer screening by mammogram     Non-sustained ventricular tachycardia (HCC)     Mixed hyperlipidemia     PVC (premature ventricular contraction)     Acquired hypothyroidism     Well adult exam Yes    Morbid (severe) obesity due to excess calories (HCC)     Vitamin D deficiency     High cholesterol     Elevated blood pressure reading     Essential hypertension      1. Breast cancer screening by mammogram  - [facility] VANE 2D+3D SCREENING BILAT (CPT=77067/79339); Future    2. Non-sustained ventricular tachycardia (HCC)  No active or recent symptoms.   Not on medication      3. Mixed hyperlipidemia  Will  recheck lipd panel in 6 months. She would like to try to lose weight and see weight loss clinic    4. PVC (premature ventricular contraction)  Stable     5. Acquired hypothyroidism  - levothyroxine 50 MCG Oral Tab; Take 1 tablet (50 mcg total) by mouth before breakfast.  Dispense: 90 tablet; Refill: 3    6. Well adult exam  - -Discussed diet and exercise, counseled on vaccine and screening guidelines.     7. Morbid (severe) obesity due to excess calories (HCC)  - [facility] - Mine Hair APRN [address], [address]    8. Vitamin D deficiency  Advised to take otc vit d     9. High cholesterol  Repeat lipid panel in 9 months  Instructed on diet/exercise/weight loss   - Lipid Panel [E]; Future  - Lipid Panel [E]    10. Elevated blood pressure reading    11. Essential hypertension  BP shows poor control with last BP of 150/98. Lifestyle changes, diet, exercise and weight loss were counseled. Medication changes as listed.   Last K was 4.4 done on 7/11/2024.  Last Cr was 0.81 done on 7/11/2024.  Last eGFR was 89 on 7/11/2024.  BP Meds: losartan Tabs - 50 MG   Pt started on losartan, monitor bp at home, goal BP readings discussed  F/u 2 weeks     12. Anxiety  Anxiety - no suicidal or homicidal thoughts. Started on wellbutrin 150mg daily Patient will call if any symptoms worsen. Patient understands risks that anti-depressants as well as anti-anxiety agents have been shown to paradoxically worsen anxiety and depression and trigger suicidal thoughts. If any of these thoughts are present patient will stop the medication(s) immediately and call the office. Patient understands and agrees to the above plan. F/u 2 weeks.     - buPROPion  MG Oral Tablet 24 Hr; Take 1 tablet (150 mg total) by mouth daily.  Dispense: 30 tablet; Refill: 0      Discussed medication dosage, usage, side effects, and goals of treatment in detail.  Instructed patient to read medication insert for all side effects  and contraindications before starting medication.   Instructed patient to review risks of medications and interactions with other medications with the pharmacist.     Orders Placed This Encounter   Procedures    Lipid Panel [E]       Meds & Refills for this Visit:  Requested Prescriptions     Signed Prescriptions Disp Refills    levothyroxine 50 MCG Oral Tab 90 tablet 3     Sig: Take 1 tablet (50 mcg total) by mouth before breakfast.    buPROPion  MG Oral Tablet 24 Hr 30 tablet 0     Sig: Take 1 tablet (150 mg total) by mouth daily.    losartan 50 MG Oral Tab 30 tablet 0     Sig: Take 1 tablet (50 mg total) by mouth daily for 30 doses.       Imaging & Consults:  OP REFERRAL TO WEIGHT LOSS CLINIC  [facility] VANE 2D+3D SCREENING BILAT (CPT=77067/00042)      Return in about 2 weeks (around 8/1/2024) for medication refills, blood pressure.    There are no Patient Instructions on file for this visit.    All questions were answered and the patient understands the plan.     Marcial Tripathi, DO        This note was prepared using Dragon Medical voice recognition dictation software. As a result errors may occur. When identified these errors have been corrected. While every attempt is made to correct errors during dictation discrepancies may still exist.

## 2024-07-24 NOTE — PROGRESS NOTES
Pt here for B12 injection, given in right deltoid. Pt tolerated well with no adverse events. Pt scheduled appointment for next B12 injection.    Future Appointments   Date Time Provider Alesia Herrera   9/8/2022  1:30 PM Moises Nam DO [facility]   10/3/2022  2:00 PM [facility] 20 NURSE [facility] 20 [facility] [address] Private car

## 2024-08-12 ENCOUNTER — PATIENT MESSAGE (OUTPATIENT)
Dept: FAMILY MEDICINE CLINIC | Facility: CLINIC | Age: 49
End: 2024-08-12

## 2024-08-13 NOTE — TELEPHONE ENCOUNTER
From: Ene Tan  To: Marcial Tripathi  Sent: 8/12/2024 11:10 AM CDT  Subject: Lab work     Hi,   I have an appt Saturday with . she was supposed to order a blood test to check my kidneys I believe since she put me on a new BP med, when I got to [facility] today it was another lipid panel ordered that I just had in July. I didn’t have it done again since I think that’s a mistake.   I have been taking the losartan, feel fine. Have not started the Wellbutrin. Lmk if I need the labs for my kidneys before this Saturdays telehealth appt. Thanks,  Ene

## 2024-08-15 DIAGNOSIS — R03.0 ELEVATED BLOOD PRESSURE READING: ICD-10-CM

## 2024-08-15 DIAGNOSIS — I10 ESSENTIAL HYPERTENSION: ICD-10-CM

## 2024-08-15 NOTE — TELEPHONE ENCOUNTER
I don't need any labs right now I plan to check her cholesterol again in ~6 months. She was planning to see weight loss clinic and make diet changes.

## 2024-08-16 ENCOUNTER — OFFICE VISIT (OUTPATIENT)
Dept: FAMILY MEDICINE CLINIC | Facility: CLINIC | Age: 49
End: 2024-08-16
Payer: COMMERCIAL

## 2024-08-16 ENCOUNTER — HOSPITAL ENCOUNTER (OUTPATIENT)
Dept: MAMMOGRAPHY | Age: 49
Discharge: HOME | End: 2024-08-16
Attending: FAMILY MEDICINE
Payer: COMMERCIAL

## 2024-08-16 ENCOUNTER — TELEPHONE (OUTPATIENT)
Dept: FAMILY MEDICINE CLINIC | Facility: CLINIC | Age: 49
End: 2024-08-16

## 2024-08-16 VITALS
HEART RATE: 97 BPM | TEMPERATURE: 98 F | DIASTOLIC BLOOD PRESSURE: 88 MMHG | HEIGHT: 68 IN | BODY MASS INDEX: 43.65 KG/M2 | WEIGHT: 288 LBS | RESPIRATION RATE: 16 BRPM | OXYGEN SATURATION: 97 % | SYSTOLIC BLOOD PRESSURE: 130 MMHG

## 2024-08-16 DIAGNOSIS — Z12.31 BREAST CANCER SCREENING BY MAMMOGRAM: ICD-10-CM

## 2024-08-16 DIAGNOSIS — R03.0 ELEVATED BLOOD PRESSURE READING: ICD-10-CM

## 2024-08-16 DIAGNOSIS — I10 ESSENTIAL HYPERTENSION: Primary | ICD-10-CM

## 2024-08-16 DIAGNOSIS — R21 RASH: ICD-10-CM

## 2024-08-16 PROCEDURE — 77067 SCR MAMMO BI INCL CAD: CPT | Performed by: FAMILY MEDICINE

## 2024-08-16 PROCEDURE — 99213 OFFICE O/P EST LOW 20 MIN: CPT | Performed by: FAMILY MEDICINE

## 2024-08-16 PROCEDURE — 77063 BREAST TOMOSYNTHESIS BI: CPT | Performed by: FAMILY MEDICINE

## 2024-08-16 RX ORDER — TRIAMCINOLONE ACETONIDE 1 MG/G
CREAM TOPICAL 2 TIMES DAILY
Qty: 15 G | Refills: 0 | Status: SHIPPED | OUTPATIENT
Start: 2024-08-16 | End: 2024-08-23

## 2024-08-16 RX ORDER — LOSARTAN POTASSIUM 50 MG/1
50 TABLET ORAL DAILY
Qty: 90 TABLET | Refills: 2 | Status: SHIPPED | OUTPATIENT
Start: 2024-08-16 | End: 2025-05-13

## 2024-08-16 NOTE — TELEPHONE ENCOUNTER
Spoke to patient.   Scheduled appt for 12:30 today with Dr. Tripathi.   Future Appointments   Date Time Provider Department Center   8/16/2024 12:30 PM Marcial Tripathi DO [facility] [address]   8/29/2024 12:00 PM Eloise Nam DO [facility] [place]   2/20/2025  1:00 PM Mine Hair APRN [facility] [address]

## 2024-08-16 NOTE — PROGRESS NOTES
Subjective:   Ene Tan is a 48 year old female who presents for Bump (Patient c/o 2 red bumps on breast started Tuesday - denies pain but itches )     History/Other:    Chief Complaint Reviewed and Verified  Nursing Notes Reviewed and   Verified  Tobacco Reviewed  Allergies Reviewed  Medications Reviewed    Medical History Reviewed  Surgical History Reviewed  Family History   Reviewed  Social History Reviewed           Rash  This is a new problem. Episode onset: 3 days. Location: right breast. The rash is characterized by redness, swelling and itchiness. She was exposed to nothing. Pertinent negatives include no diarrhea, fatigue, fever, joint pain, rhinorrhea, shortness of breath, sore throat or vomiting. Treatments tried: topical HC and allergy pill x 1 dose yesterday and one dose today. There is no history of allergies, asthma or eczema.       Denies exposure to new product, plant, spider bite, chemical, new pets.      had poison ivy last week.       Denies any lump on the breast .    Mammogram completed today.     Patient presents for follow-up of elevated blood pressure. She is exercising and is adherent to a low-salt diet. Blood pressure is well controlled at home- 130s/80s. When she is stressed she has had high reading systolic 150.  Cardiac symptoms: none. Patient denies: chest pain, dyspnea, exertional chest pressure/discomfort, orthopnea and paroxysmal nocturnal dyspnea. She is taking losartan 50mg / day. No SE effects.     She has not started the wellbutrin for anxiety.   Tobacco:  She has never smoked tobacco.    Current Outpatient Medications   Medication Sig Dispense Refill    triamcinolone 0.1 % External Cream Apply topically 2 (two) times daily for 7 days. Apply to affected area on the chest. 15 g 0    losartan 50 MG Oral Tab Take 1 tablet (50 mg total) by mouth daily. 90 tablet 2    levothyroxine 50 MCG Oral Tab Take 1 tablet (50 mcg total) by mouth before breakfast. 90  tablet 3    buPROPion  MG Oral Tablet 24 Hr Take 1 tablet (150 mg total) by mouth daily. (Patient not taking: Reported on 2024) 30 tablet 0         Review of Systems:  Review of Systems   Constitutional:  Negative for fatigue and fever.   HENT:  Negative for rhinorrhea and sore throat.    Respiratory:  Negative for shortness of breath.    Gastrointestinal:  Negative for diarrhea and vomiting.   Musculoskeletal:  Negative for joint pain.   Skin:  Positive for rash.     HISTORY:  Past Medical History:    Abnormal stress test    Acquired hypothyroidism    Body piercing    Heart palpitations    Hemorrhoids    IUD (intrauterine device) in place    Paragar2014    Mixed hyperlipidemia    Non-sustained ventricular tachycardia (HCC)    Obesity    PVC (premature ventricular contraction)    Stress    Wears glasses      Past Surgical History:   Procedure Laterality Date      , ,     Cyst removal      Insert intrauterine device            Remove intrauterine device  2020      Family History   Problem Relation Age of Onset    Lipids Other         Family History of     Lipids Mother         unknown    Lipids Father         unknown    Hypertension Father         unknown    Heart Attack Maternal Grandmother         age of death 72    Other (Brain Aneurysm) Paternal Grandmother         age of death 75    Breast Cancer Neg     Colon Cancer Neg       Social History     Socioeconomic History    Marital status:     Number of children: 3   Occupational History    Occupation: DENTAL HYGENTIST     Employer: [employer]   Tobacco Use    Smoking status: Never     Passive exposure: Never    Smokeless tobacco: Never   Vaping Use    Vaping status: Never Used   Substance and Sexual Activity    Alcohol use: Not Currently    Drug use: No    Sexual activity: Yes     Partners: Male     Birth control/protection: Vasectomy   Other Topics Concern    Caffeine Concern No    Stress Concern No    Weight  Concern Yes    Special Diet No    Exercise No    Seat Belt Yes            Objective:   /88   Pulse 97   Temp 98 °F (36.7 °C) (Temporal)   Resp 16   Ht 5' 8\" (1.727 m)   Wt 288 lb (130.6 kg)   LMP 08/02/2024 (Exact Date)   SpO2 97%   BMI 43.79 kg/m²  Estimated body mass index is 43.79 kg/m² as calculated from the following:    Height as of this encounter: 5' 8\" (1.727 m).    Weight as of this encounter: 288 lb (130.6 kg).  Physical Exam  Constitutional:       Appearance: Normal appearance.   Skin:     Comments: two large erythematous patches on right breast  No right breast lumps.   Rest of the skin exam shows blotchy skin- this is her baseline.    Neurological:      Mental Status: She is alert.       Assessment & Plan:   1. Essential hypertension (Primary)  Last K was 4.4 done on 7/11/2024.  Last Cr was 0.81 done on 7/11/2024.  Last eGFR was 89 on 7/11/2024.  BP Meds: losartan Tabs - 50 MG     -     Losartan Potassium; Take 1 tablet (50 mg total) by mouth daily.  Dispense: 90 tablet; Refill: 2  2. Elevated blood pressure reading  -     Losartan Potassium; Take 1 tablet (50 mg total) by mouth daily.  Dispense: 90 tablet; Refill: 2  3. Rash  Likely related to bug bite. Does not appear infected, luly is no drainage or warmth, advised to apply triamcinolone for 1 week.   She is to notify if rash is worsening   F/u if not improving or worsening.   -     Triamcinolone Acetonide; Apply topically 2 (two) times daily for 7 days. Apply to affected area on the chest.  Dispense: 15 g; Refill: 0        No follow-ups on file.    Marcial Tripathi DO, 8/16/2024, 12:50 PM

## 2024-08-16 NOTE — TELEPHONE ENCOUNTER
Patient is calling to ask if she can add Bug bite to vv appointment tomorrow?  Patient would like to come in today if you can work her into the scheduled.    Please call to advise PH. [phone]    Future Appointments   Date Time Provider Department Center   8/16/2024 11:00 AM ISELA MCDERMOTT [facility] [place]   8/17/2024  7:30 AM Marcial Tripathi,  [facility] [address]   8/29/2024 12:00 PM Eloise Nam,  [facility] [place]   2/20/2025  1:00 PM Mine Hair APRN [facility] [address]

## 2024-08-21 RX ORDER — LOSARTAN POTASSIUM 50 MG/1
50 TABLET ORAL DAILY
Qty: 30 TABLET | Refills: 0 | OUTPATIENT
Start: 2024-08-21

## 2025-01-07 ENCOUNTER — OFFICE VISIT (OUTPATIENT)
Facility: CLINIC | Age: 50
End: 2025-01-07
Payer: COMMERCIAL

## 2025-01-07 VITALS
BODY MASS INDEX: 43.5 KG/M2 | HEART RATE: 91 BPM | DIASTOLIC BLOOD PRESSURE: 80 MMHG | HEIGHT: 68 IN | SYSTOLIC BLOOD PRESSURE: 130 MMHG | WEIGHT: 287 LBS

## 2025-01-07 DIAGNOSIS — Z01.419 ENCOUNTER FOR WELL WOMAN EXAM WITH ROUTINE GYNECOLOGICAL EXAM: Primary | ICD-10-CM

## 2025-01-07 DIAGNOSIS — Z12.4 CERVICAL CANCER SCREENING: ICD-10-CM

## 2025-01-07 PROCEDURE — 87624 HPV HI-RISK TYP POOLED RSLT: CPT | Performed by: OBSTETRICS & GYNECOLOGY

## 2025-01-07 PROCEDURE — 99396 PREV VISIT EST AGE 40-64: CPT | Performed by: OBSTETRICS & GYNECOLOGY

## 2025-01-07 PROCEDURE — 88175 CYTOPATH C/V AUTO FLUID REDO: CPT | Performed by: OBSTETRICS & GYNECOLOGY

## 2025-01-07 NOTE — PROGRESS NOTES
Ene Tan is a 49 year old female  Patient's last menstrual period was 2024 (exact date).   Chief Complaint   Patient presents with    Wellness Visit   .Patient has no cmplaints    OBSTETRICS HISTORY:  OB History    Para Term  AB Living   5 3 2     3   SAB IAB Ectopic Multiple Live Births           3      # Outcome Date GA Lbr Luiz/2nd Weight Sex Type Anes PTL Lv   5 Term 09    F Caesarean   PARKER   4 Para 05    M Caesarean   PARKER   3 Term 09/15/03    M Caesarean   PARKER   2             1                Obstetric Comments   miscarriage       GYNE HISTORY:  Periods regular monthly    History   Sexual Activity    Sexual activity: Yes    Partners: Male    Birth control/ protection: Vasectomy        Pap Date: 23  Pap Result Notes: neg/neg        MEDICAL HISTORY:  Past Medical History:    Abnormal stress test    Acquired hypothyroidism    Body piercing    Heart palpitations    Hemorrhoids    IUD (intrauterine device) in place    Paragard 2014    Mixed hyperlipidemia    Non-sustained ventricular tachycardia (HCC)    Obesity    PVC (premature ventricular contraction)    Stress    Wears glasses       SURGICAL HISTORY:  Past Surgical History:   Procedure Laterality Date      , ,     Cyst removal      Insert intrauterine device            Remove intrauterine device  2020       SOCIAL HISTORY:  Social History     Socioeconomic History    Marital status:      Spouse name: Not on file    Number of children: 3    Years of education: Not on file    Highest education level: Not on file   Occupational History    Occupation: DENTAL HYGENTIST     Employer: [employer]   Tobacco Use    Smoking status: Never     Passive exposure: Never    Smokeless tobacco: Never   Vaping Use    Vaping status: Never Used   Substance and Sexual Activity    Alcohol use: Not Currently    Drug use: No    Sexual activity: Yes     Partners: Male     Birth  control/protection: Vasectomy   Other Topics Concern     Service Not Asked    Blood Transfusions Not Asked    Caffeine Concern No    Occupational Exposure Not Asked    Hobby Hazards Not Asked    Sleep Concern Not Asked    Stress Concern No    Weight Concern Yes    Special Diet No    Back Care Not Asked    Exercise No    Bike Helmet Not Asked    Seat Belt Yes    Self-Exams Not Asked   Social History Narrative    Not on file     Social Drivers of Health     Financial Resource Strain: Not on file   Food Insecurity: Not on file   Transportation Needs: Not on file   Physical Activity: Not on file   Stress: Not on file   Social Connections: Not on file   Housing Stability: Not on file       FAMILY HISTORY:  Family History   Problem Relation Age of Onset    Lipids Other         Family History of     Lipids Mother         unknown    Lipids Father         unknown    Hypertension Father         unknown    Heart Attack Maternal Grandmother         age of death 72    Other (Brain Aneurysm) Paternal Grandmother         age of death 75    Breast Cancer Neg     Colon Cancer Neg        MEDICATIONS:    Current Outpatient Medications:     losartan 50 MG Oral Tab, Take 1 tablet (50 mg total) by mouth daily., Disp: 90 tablet, Rfl: 2    levothyroxine 50 MCG Oral Tab, Take 1 tablet (50 mcg total) by mouth before breakfast., Disp: 90 tablet, Rfl: 3    buPROPion  MG Oral Tablet 24 Hr, Take 1 tablet (150 mg total) by mouth daily. (Patient not taking: Reported on 1/7/2025), Disp: 30 tablet, Rfl: 0    ALLERGIES:  Allergies[1]      Review of Systems:  Constitutional:  Denies fatigue, night sweats, hot flashes  Eyes:  denies blurred or double vision  Cardiovascular:  denies chest pain or palpitations  Respiratory:  denies shortness of breath  Gastrointestinal:  denies heartburn, abdominal pain, diarrhea or constipation  Genitourinary:  denies dysuria, incontinence, abnormal vaginal discharge, vaginal itching  Musculoskeletal:   denies back pain.  Skin/Breast:  Denies any breast pain, lumps, or discharge.   Neurological:  denies headaches, extremity weakness or numbness.  Psychiatric: denies depression or anxiety.  Endocrine:   denies excessive thirst or urination.  Heme/Lymph:  denies history of anemia, easy bruising or bleeding.      PHYSICAL EXAM:   Constitutional: well developed, well nourished  Head/Face: normocephalic  Neck/Thyroid: thyroid symmetric, no thyromegaly, no nodules, no adenopathy  Lymphatic:no abnormal supraclavicular or axillary adenopathy is noted  Breast: normal without palpable masses, tenderness, asymmetry, nipple discharge, nipple retraction or skin changes  Abdomen:  soft, nontender, nondistended, no masses  Skin/Hair: no unusual rashes or bruises  Extremities: no edema, no cyanosis  Psychiatric:  Oriented to time, place, person and situation. Appropriate mood and affect    Pelvic Exam:  External Genitalia: normal appearance, hair distribution, and no lesions  Urethral Meatus:  normal in size, location, without lesions and prolapse  Bladder:  No fullness, masses or tenderness  Vagina:  Normal appearance without lesions, no abnormal discharge  Cervix:  Normal without tenderness on motion  Uterus: normal in size, contour, position, mobility, without tenderness  Adnexa: normal without masses or tenderness  Perineum: normal  Anus: no hemorroids     Assessment & Plan:  Diagnoses and all orders for this visit:    Encounter for well woman exam with routine gynecological exam    Cervical cancer screening  -     Hpv High Risk , Thin Prep Collect  -     ThinPrep PAP Smear B                   [1] No Known Allergies

## 2025-01-08 LAB — HPV E6+E7 MRNA CVX QL NAA+PROBE: NEGATIVE

## 2025-02-20 ENCOUNTER — OFFICE VISIT (OUTPATIENT)
Dept: INTERNAL MEDICINE CLINIC | Facility: CLINIC | Age: 50
End: 2025-02-20
Payer: COMMERCIAL

## 2025-02-20 VITALS
WEIGHT: 284 LBS | RESPIRATION RATE: 16 BRPM | SYSTOLIC BLOOD PRESSURE: 138 MMHG | DIASTOLIC BLOOD PRESSURE: 80 MMHG | BODY MASS INDEX: 43.04 KG/M2 | HEIGHT: 68 IN | HEART RATE: 94 BPM

## 2025-02-20 DIAGNOSIS — E78.5 DYSLIPIDEMIA: ICD-10-CM

## 2025-02-20 DIAGNOSIS — G47.33 OSA ON CPAP: ICD-10-CM

## 2025-02-20 DIAGNOSIS — I10 HYPERTENSION, UNSPECIFIED TYPE: ICD-10-CM

## 2025-02-20 DIAGNOSIS — E03.9 ACQUIRED HYPOTHYROIDISM: ICD-10-CM

## 2025-02-20 DIAGNOSIS — R73.03 PREDIABETES: ICD-10-CM

## 2025-02-20 DIAGNOSIS — E66.813 CLASS 3 SEVERE OBESITY WITH SERIOUS COMORBIDITY AND BODY MASS INDEX (BMI) OF 40.0 TO 44.9 IN ADULT, UNSPECIFIED OBESITY TYPE (HCC): ICD-10-CM

## 2025-02-20 DIAGNOSIS — E66.01 CLASS 3 SEVERE OBESITY WITH SERIOUS COMORBIDITY AND BODY MASS INDEX (BMI) OF 40.0 TO 44.9 IN ADULT, UNSPECIFIED OBESITY TYPE (HCC): ICD-10-CM

## 2025-02-20 DIAGNOSIS — Z51.81 ENCOUNTER FOR THERAPEUTIC DRUG MONITORING: Primary | ICD-10-CM

## 2025-02-20 PROCEDURE — 99205 OFFICE O/P NEW HI 60 MIN: CPT | Performed by: NURSE PRACTITIONER

## 2025-02-20 RX ORDER — TIRZEPATIDE 5 MG/.5ML
5 INJECTION, SOLUTION SUBCUTANEOUS WEEKLY
Qty: 2 ML | Refills: 3 | Status: SHIPPED | OUTPATIENT
Start: 2025-02-20

## 2025-02-20 RX ORDER — TIRZEPATIDE 2.5 MG/.5ML
2.5 INJECTION, SOLUTION SUBCUTANEOUS WEEKLY
Qty: 2 ML | Refills: 0 | Status: SHIPPED | OUTPATIENT
Start: 2025-02-20

## 2025-02-20 NOTE — PATIENT INSTRUCTIONS
Welcome to the [facility]...your Lifestyle Renovation begins now!  Thank you for placing your trust in our health care team, I look forward to working with you along this journey to better health!    Next steps:     1.  Call our office at [phone] to schedule a personal nutrition consultation with one of our registered dieticians, De Ba. Bring along your food journal (3 days minimum). See journal options below.  2.  Body composition completed today with findings of: Total body fat: 48% (goal < 32%), Visceral Fat: 17 (goal <10), Muscle mass: 11% (goal >18%), and waist circumference 47 inches (goal <35 inches).  3.  Use contraception at all times while on anti-obesity medications.  4.  Fill your prescribed medication and take as discussed and prescribed: Start Zepbound at 2.5 mg weekly. After 4 weeks increase to the next dose of 5 mg weekly. If at a weight plateau for >3 weeks, then send Matchfund message with current scale weight to determine if a dose adjustment is appropriate. Otherwise plan to maintain this dose until next visit. Any further dose titrations beyond this dose will be considered at appointments only, unless otherwise discussed. Visit the website [email or web address] and click on Consumers for additional details, savings, and further dosing instructions. This medication may require a prior authorization (PA) by your insurance. A PA may take one week plus to complete and our office will be in touch during this process if needed. If cost/supply prohibitive plan: contact office - consider Wegovy if covered.    Zepbound is being prescribed for the dual FDA indication for the treatment of Overweight/Obesity and Sleep Apnea.    Tips while taking an injectable medication:    Be an intuitive eater. Listen to your hunger and fullness signals, stopping when you are full.  Consume protein and produce in your day, striving for a rainbow of color of produce.  Reduce  portions to starting size of 1 cup and check in with your gut to see if you are full. Use a sand timer to slow down your eating pace to allow for 15-20 minutes to complete a meal and use the \"2 bite rule\".  Reduce refined sugars and high fat foods, as they may contribute to greater side effects of nausea and heartburn.  Stop eating 3 hours before bedtime to allow your food to digest.  Remain hydrated with water or non caloric and non caffeine beverages.  Use over the counter malu lozenge/supplement to help reduce nausea if needed.  If you have been off your medication for more than 2 weeks please notify our office to determine next dosing, as a return to previous dose may not be appropriate or tolerated.      Please try to work on the following dietary changes this first month:    1.  Drink water with meals and throughout the day, cut down on soda and/or juice if consumed. Consider flavored water options like Bubbly, Spindrift, Hint and Randi. Reduce alcohol servings to 4 per week maximum.  2.  Have protein with each meal, examples include: greek yogurt, cottage cheese, hard boiled egg, tofu, chicken, fish, or tuna.   3.  Work towards reducing/eliminating refined carbohydrates and sugars which includes items such as sweets, as well as rice, pasta, and bread and make sure to choose whole grain options when having them with just 1 serving per meal about the size of your inner palm.  4.  Consume non starchy veggies daily working towards making them a good 50% of your daily food intake. Add them to lunch and dinner consistently.  5.  Start a daily probiotic: VSL#3 is recommended, (order on line at www.vsl3.com). Take 1 capsule daily with water for 30 days, then reduce to 1 every other day (this will reduce the cost). Capsules can be left out of refrigerator for 2 weeks. I recommend using a pill box weekly and keeping the bottle in the fridge.    Please download reagan My Fitness Pal, LoseIt! Or My Net Diary to monitor  daily dietary intake and you will be able to see if you are eating the right amount of calories or too much or too little which would hinder weight loss. Additionally this will help to see your daily carbohydrate and protein intake. When you set the kevin up choose 1.5 lbs/week as a goal.  Keeping a paper food journal is an option as well to remain accountable for your choices- this is the start to mindful eating! A low calorie diet has been consistently shown to support weight loss.    Continue or start exercising to help establish a routine. If not already exercising begin with 1 day/week and progress as able with the goal of working towards 30 minutes 5 days a week at a minimum. A variety or cardio, strength and stretching is important. Review resources below to help support you in building this healthy routine.    Meditation daily can help manage and control stress. Chronic stress can make weight loss difficult.  Exercising is one way to help with stress, but meditation using the CALM Kevin or another comparable alternative can be done in your home or place of work with little time commitment. This Kevin can also help work on behavior change and improve sleep. Check out the segment under Calm Masterclass and listen to The 4 Pillars of Health. A great way to begin learning about the foundation of lifestyle with practical tips to use in your every day. In addition, we offer counseling services and support for individual connection and care. A referral is necessary so please let me know if this is a service you are interested.    Check out www.yourweightmatters.org blog for continued support and education along your weight loss journey to optimal health!      Patient Resources:    Personal Training/Fitness Classes/Health Coaching    [facility] in [place]: Full fitness center with group fitness and personal training located in [place].  Health Coaching with Neris Sanchez, Jake Cabrera, and Johnson Denney  at our [facility]- individual coaching to work on your health goals. Call [phone] and/or email @ [email or web address]. Free 60 minute consult when client of [facility].  LILIYA Montgomery @ [email or web address]. A variety of group fitness options plus various yoga classes [phone] and/or email Dang at [email or web address]  [facility] with multiple locations: Lodo Software (www.[facility]), F45 Training (www.j31tuffqjwmAsurvest), Symcat Body Bootcamp (www.Sonar.mep.CloudTags), Catalog Spree (www.Driverdo), The Exercise  (www.exercisecoach.com), Club Pilates (www.clubPhonitive - Touchalize.CloudTags)    Online Fitness  Fitness  on GreenNote  Fit in 10 DVD series   www.Okeyko  Chair exercises via Sit and Be Fit (www.sitandAlcanzar Solar.org) and zLense (www.Vita Coco) or Perez Bell or aNun Muñoz videos on YouQoostarube.  Hip Hop Fit with Three Screen Games at www.hiphopBreakthrough Behavioral.Metreos Corporation    Apps for on the Go Fitness  gifted2you 7 Minute Workout (orange box with white 7) - free on the go HIIT training kevin  Peloton Kevin @ www.onepeloton.com    Nutrition Trackers, Meal Preparation, and Other Meal Programs  LoseIT! And My Fitness Pal apps and on line for tracking nutrition  NOOM - virtual health coaching  FitFoundation (healthy meals on the go) in [place] @ www.ftuuqnjmahnwr6v.CloudTags  Florencia HERNANDES @ www.bistromd.CloudTags and Ddncoe69 (calorie smart and low carb plans recommended) @ www.pfokio64.com, Metabolic Meals @ www.MyMetabolicMeals.com - individual prepared meals to go  Gobble, Blue Apron, Home , Every Plate, Sunbasket- on line meal delivery programs for preparation at home  Meal Village in [place] for homemade meals to go @ www.mealvillage.com  Diet Doctor @ www.dietdoctor.com - low carb swaps  ReciMe and Mealime kevin (grocery and meal planning)    Stress, Anxiety, Depression, Trauma  CALM meditation kevin (www.calm.com)  Headspace  Don't  let anxiety run your life. Using the science of emotion regulation and mindfulness to overcome fear and worry by Angelito Castro PsyD and Sher Medina MA.  The NewsWhip Podcast (September 27, 2023): 6 Magic Words That Stop Anxiety  What Happened to You?- a look at the impact trauma has on behavior written by Mosiés Moya and Dr. Anant Basurto  Whole Again by Judah Clemente - discovering your true self after trauma    Mindful Eating/The Hungry Brain  Am I Hungry? Mindful eating virtual  reagan (www.amihungry.com)  The Hungry Brain by Joaquina Amaya, PhD  Mindless Eating by Hamzah Fenton  Weight Loss Surgery Will Not Treat Food Addiction by Lisa Tripathi Ph.D    Metabolic Dysfunction, Hormones and Cravings  Why We Get Sick? By Evangelista Torres (insulin resistance)  Your Body in Balance: The New Science of Food, Hormones, and Health by Dr. Francisco Javier Mclaughlin  The Complete Guide to fasting by Dr. Albert  Fast Like a Girl by Dr. Angela Barnes  The M Factor (documentary on PBS about Menopause)  Sugar, Salt & Fat by Amber Palacio, Ph.D, R.D.  The Truth About Sugar - documentary on sugar (Free on Avancen MOD, https://[email or web address] TVu.[email or web address])  Presentation on SUGAR called Sugar: The Bitter Truth by Dr. Antonio Matos (Avancen MOD) https://[email or web address] [email or web address]?[email or web address]  Reverse Visceral Fat: #1 Way to Increase Your Lifespan & End Inflammation with Dr. Arash Larios on Utube @ https://youWeatherNation TVu.be/[email or web address]    Nutrition Support  You Are What You Eat - Netfix series on twin study looking at impact of nutrition changes on health  The End of Dieting: How to Live for Life by Dr. Teto Márquez M.D. or listen to The [employer] Podcast Episode 63: Understanding \"Nutritarian\" Eating w/Dr. Teto Márquez  The Game Changers- Netflix Documentary on plant based nutrition  The Dr. Dominguez T5 Wellness Plan by Dr. Bharat Dominguez MD  The Complete Guide to fasting by Dr. Albert  @[facility] ([facility] Dietician with support  surrounding nutrition and meal prep/planning)    Education, Motivation and Support Resources  Live to 100: Secrets of the Blue Zones - Netflix series on the secrets to communities living over 100 years old  Atomic Habits by Hebert Guzman (a book about taking small steps to promote greater behavior change)   Motivation reagan (black box with white \")- daily supportive messages sent to your phone  Can't Hurt Me by Angelito Celaya (a book exploring the power of discipline in achieving your goals)  Fed Up - documentary about obesity (Free on Utube)  Www.yourweightmatters.org - Obesity Action Coalition sponsored Blog posts  Obesity Action Coalition Resources on topics specific to weight management (www.obesityaction.org)  Fitlosophy Fitspiration - journal to better health (journal book found at [employer] in fitness aisle)  Harshad Allen talk titled: The Call to Courage (Netflix)  The Exam Room by the Physician's Committee (Podcast)  Nutrition Facts by Dr. Bernal (Podcast)      Balanced Nutrition includes:     Build the mentality of Food 4 Fuel. Clean eating with whole foods and eliminating/reducing ultra processed foods.  Be an intuitive eater and using mindful eating practices.  Eat a balanced plate with protein and produce at all meals: 1/4 plate- protein, 1/2 plate non starchy veggies, and 1/4 plate fruit or complex carbohydrate.  Drink water with all meals and use a salad plate to naturally reduce portions.  Eliminate/reduce late night eating by stopping after 7pm. Allowing your body to fast for 12 hours (drink only water, tea or black coffee without any additives).            Re-thinking Nutrition: Learning to See Food as Fuel  October 8, 2019  Posted in Blog, Nutrition  By Your Weight Matters Campaign    “Dieting” can start to feel challenging when we begin to associate healthy behaviors with “punishment.” Too often, we overlook the real reason we eat food. It's not only meant to be enjoyed, but also to provide basic fuel for  our bodies.  Learning to see food as fuel can help you find balance in your journey with weight. It will teach you about the primal purpose of food, the effect certain foods have on health, and how to listen carefully to what your body is trying to tell you.  It Starts with Cells  Did you know your body has more than 35 trillion cells? Each one serves a purpose.  Once a cell has completed its purpose, it dies. Your body replaces dead and worn-out cells with new and energetic ones. It also depends on this ongoing cell cycle to keep you healthy. And guess what? The foods you eat help shape this process.  Seeing Food as Fuel  Eating the right foods helps build and repair cells to be stronger than before. It does this by getting nutrition from whole grains, vitamins, minerals, fats, protein and other nutrients.  These essential nutrients matter greatly to every single cell in your body. They make up your:  Cell membrane  Nucleus  Mitochondria  When cells join together, they make tissue that brings life to your bones, brain, skin, nerves, muscles, etc. The health and lifespan of your cells depend on the nutrients you get from food. That is why healthy food choices are so important.   Once you can start to see food as being fuel for your body, you will get better at making the healthy choice the easy choice. Food will be less about rewards or punishments and more about supporting your overall health and happiness.  Building Blocks of Good Nutrition  A diet without enough fiber, protein and healthy fats can cause your cells to become brittle, leaky and tired. When cells can't do what they are designed to do, problems like inflammation, cancer and other difficult health conditions start to arise.  Foods that Support Healthy Cells:  Unsaturated Fats - Fish, nuts avocados, olive oil, flax seed, etc.  Protein - Poultry, lean beef, yogurt, eggs, seeds, beans, etc.  Antioxidants - Fruits, dark green veggies, sweet potatoes, tea,  etc.  So, the next time you grab something to eat, ask yourself how that food helps or hurts your body. This is a part of living mindful and being knowledgeable about what you consume regularly.  When you start to see food as fuel, not just a tasty treat or the solution to a bad temper, you will start to make healthier choices more often. Making new habits is one of the building blocks to successful long-term weight management!      Perimenopause/Menopause and Obesity    The prevalence of obesity, which is closely associated with cardiovascular risk, increases significantly in American women after they reach age 40; the prevalence reaches 65% between 40 and 59 years, and 73.8% in women over age 60.    According to the Healthy Women Study, the average weight gain in perimenopausal women was about five pounds; however, 20% of the population they studied gained 10 pounds or more. Not only is the weight increase from a drop in estrogen but it’s also because of a decrease in energy expenditure. Some women may notice an overall weight gain, whereas others may not see a difference on the scale but may notice that their pants aren’t buttoning as easily. Both are surprising to many women because they may not notice a difference in their dietary intake or activity.    The reasons for increasing obesity in menopausal women are not clear. Some researchers argue that the absence of estrogens may be an important obesity-triggering factor. Estrogen deficiency enhances metabolic dysfunction predisposing to type 2 diabetes mellitus, the metabolic syndrome, and cardiovascular diseases. Estrogen plays a vital role in fat storage and distribution. Before perimenopause, estrogen deposits fat in your thighs, hips, and buttocks. During and after menopause, the drop in estrogen leads to an overall increase in total body fat but now more so in your midsection. Studies have consistently shown that this waistline increase is different from  when you were younger. An increase in visceral (abdominal) fat is linked to an increase in insulin resistance, diabetes, and inflammatory diseases.    Another factor contributing to weight gain in perimenopause may be the increased appetite and calorie intake that occurs in response to hormone changes. In one study, levels of the “hunger hormone” ghrelin were found to be significantly higher in perimenopausal women compared with premenopausal and postmenopausal women.    The low estrogen levels in the late stages of menopause may also impair the function of leptin and neuropeptide Y, hormones that control fullness and appetite. Therefore, women in the late stages of perimenopause who have low estrogen levels may be driven to eat more calories and store fat.    Make weight gain a modifiable risk factor  So, you may be thinking--I’m destined for failure! But this isn’t true. Although the risk of weight gain as a middle-aged woman is higher, this does not mean that it is required. It does mean that we may have to work a little harder to prevent this from happening. It is important to keep in mind that many of the health risks found in the menopause transition are also affected by weight. If we are able to keep a healthy weight, or at least minimize any weight gain, then we are likely to minimize these additional health risks. Now that you know the risks, here are some ways to stay healthy during this midlife transition and avoid a midlife crisis:    Reduce calories. During menopause, our energy expenditure decreases even if our activity level and nutrient intake stays the same. This is secondary to the hormone changes with menopause as well as the natural muscle loss that is occurring. We need about 200 fewer calories in our 50s than we did in our 30s and 40s. This means that we’ve got to move more and eat less to keep our healthy weight. To help decrease portion sizes, try splitting your meals with a friend, ordering  the lighter portion when available, or put half in the takeout box right away. Swap out dessert for fruit or yogurt.   Reduce carbs. Cut back on carbs in order to reduce the increase in belly fat, which drives metabolic problems   Add fiber. Eat a high-fiber diet that includes flaxseeds, which may improve insulin sensitivity.   Work out. Engage in strength training to improve body composition, increase strength, and build and maintain lean muscle. The American Heart Association recommends 150 minutes of moderate exercise per week. Add ANY activity to your day. Strength and resistance training help maintain bone mass. This will help to prevent osteoporosis, which is bone loss that can lead to easy fractures.   Rest and relax. Try to relax before bed and get enough sleep in order to keep your hormones and appetite under control.   Get support and learn to cope without food. Many women (and men) admit to eating under stress. And, let’s face it, middle age can bring some tough times. Children are often departing from the home, and some are returning. Your parents now need more help and guidance. This can be disruptive to our everyday lives. Focus on using nonfood stress relievers. Try going for a walk, deep breathing, or scheduling some “me” time with your favorite book to unwind. Seek out support from friends and loved ones who may have gone through a similar situation.    Taken from The North American Menopause Society  Oma Cox MD, FACOG, [facility]      What are proteins?  Proteins are one of three primary macronutrients that provide energy to the human body, along with fats and carbohydrates. Proteins are also responsible for a large portion of the work that is done in cells; they are necessary for proper structure and function of tissues and organs, and also act to regulate them. They are comprised of a number of amino acids that are essential to proper body function, and serve as the building blocks of  body tissue.  There are 20 different amino acids in total, and the sequence of amino acids determines a protein's structure and function. While some amino acids can be synthesized in the body, there are 9 amino acids that humans can only obtain from dietary sources (insufficient amounts of which may sometimes result in death), termed essential amino acids. Foods that provide all of the essential amino acids are called complete protein sources, and include both animal (meat, dairy, eggs, fish) as well as plant-based sources (soy, quinoa, buckwheat).    Proteins can be categorized based on the function they provide to the body. Below is a list of some types of proteins:  Antibody--proteins that protect the body from foreign particles, such as viruses and bacteria, by binding to them  Enzyme--proteins that help form new molecules as well as perform the many chemical reactions that occur throughout the body  Messenger--proteins that transmit signals throughout the body to maintain body processes  Structural component--proteins that act as building blocks for cells that ultimately allow the body to move  Transport/storage--proteins that move molecules throughout the body  As can be seen, proteins have many important roles throughout the body, and as such, it is important to provide sufficient nutrition to the body to maintain healthy protein levels.    How much protein do I need?  The amount of protein that the human body requires daily is dependent on many conditions, including overall energy intake, growth of the individual, and physical activity level. It is often estimated based on body weight, as a percentage of total caloric intake (10-35%), or based on age alone. 0.8g/kg of body weight is a commonly cited recommended dietary allowance (RDA). This value is the minimum recommended value to maintain basic nutritional requirements, but consuming more protein, up to a certain point, maybe beneficial, depending on the  sources of the protein.  The recommended range of protein intake is between 0.8 g/kg and 1.8 g/kg of body weight, dependent on the many factors listed above. People who are highly active, or who wish to build more muscle should generally consume more protein. Some sources suggest consuming between 1.8 to 2 g/kg for those who are highly active. The amount of protein a person should consume, to date, is not an exact science, and each individual should consult a specialist, be it a dietitian, doctor, or , to help determine their individual needs. I recommend your daily protein intake to be 100 grams/day.    Foods high in protein  There are many different combinations of food that a person can eat to meet their protein intake requirements. For many people, a large portion of protein intake comes from meat and dairy, though it is possible to get enough protein while meeting certain dietary restrictions you might have. Generally, it is easier to meet your RDA of protein by consuming meat and dairy, but an excess of either can have a negative health impact. There are plenty of plant-based protein options, but they generally contain less protein in a given serving. Ideally, a person should consume a mixture of meat, dairy, and plant-based foods in order to meet their RDA and have a balanced diet replete with nutrients.    If possible, consuming a variety of complete proteins is recommended. A complete protein is a protein that contains a good amount of each of the nine essential amino acids required in the human diet. Examples of complete protein foods or meals include:    Meat/Dairy examples  Eggs  Chicken breast  Cottage cheese  Greek yogurt  Milk  Lean beef  Tuna  Turkey breast  Fish  Shrimp    Vegan/plant-based examples  Buckwheat  Hummus and terrence  Soy products (tofu, tempeh, edamame beans)  Peanut butter on toast or some other bread  Beans and rice  Quinoa  Hemp and marilyn seeds  Spirulina  Generally,  meat, poultry, fish, eggs, and dairy products are complete protein sources. Nuts and seeds, legumes, grains, and vegetables, among other things, are usually incomplete proteins. There is nothing wrong with incomplete proteins however, and there are many healthy, high protein foods that are incomplete proteins. As long as you consume a sufficient variety of incomplete proteins to get all the required amino acids, it is not necessary to specifically eat complete protein foods. In fact, certain high fat red meats for example, a common source of complete proteins, can be unhealthy.   Below are some examples of high protein foods that are not complete proteins:  Almonds  Oats   Broccoli  Lentils  Marty bread  Sal seeds  Pumpkin seeds  Peanuts  [place] sprouts  Grapefruit  Green peas  Avocados  Mushrooms  As can be seen, there are many different foods a person can consume to meet their RDA of protein. The examples provided above do not constitute an exhaustive list of high protein or complete protein foods. As with everything else, balance is important, and the examples provided above are an attempt at providing a list of healthier protein options (when consumed in moderation).    Amount of protein in common food      Protein Amount  Milk (1 cup/8 oz)  8 g  Egg (1 large/50 g)  6 g  Meat (1 slice / 2 oz)  14 g  Seafood (2 oz)  16 g  Bread (1 slice/64 g)   8 g  Corn (1 cup/166 g)  16 g  Rice (1 cup/195 g)  5 g  Dry Bean (1 cup/92 g)   16 g  Nuts (1 cup/92 g)    20 g  Fruits and Veggie (1 cup)  1 g    Data above taken from www.calculator.net    The Power of Protein:    High Protein Foods:  FISH  (3-6 ounces/meal)  All types of fish  Seafood (shrimp, scallops, clams, mussels, lobster)  EGGS     2-3 eggs/meal  DAIRY (2/3 to 1 ½ cup)  Cottage cheese   Greek yogurt  PLANTS (½-3/4 cup/meal)  Legumes: Dried beans and peas (black beans, berkowitz beans, garbanzo beans, kidney, cannellini, navy, split peas, black eyed  peas)  Lentils  Quinoa  Soy (edamame, tofu)  PORK   (3-6 oz/meal)  Tenderloin  Pork chop  Top loin roast, boneless  Sirloin roast, boneless  Andorran edwards (nitrate free)  Boiled deli ham (nitrate free)    Additional Protein Sources:  BEEF    (3-6 oz/meal)  Flank steak       Skirt steak  Bottom round(rump roast), select    Ground beef, 90% lean (ground sirloin)  Phil eye steak, choice  Eye of round roast, choice   POULTRY  (3-6 oz/meal)  Ground chicken or turkey  Chicken, no skin  Turkey, no skin  PROTEIN SHAKES: OWYN, Koia, and Rebbl (plant based and dairy free), Corepower by [employer], Orgain (plant based option available), Premier Protein, JuicePlus Complete (www.juiceplus.com)  PROTEIN BARS: RXBAR, PowerCrunch, Quest, Barebells, Mosh  SNACK/ON-the-GO OPTIONS: Chomps Meatstick, Oats Overnight ([email or web address]), [place] brand protein granola

## 2025-02-20 NOTE — PROGRESS NOTES
HISTORY OF PRESENT ILLNESS  Chief Complaint   Patient presents with    Weight Check     Dr oshea referral. Phentermine in past. Some exercise       Ene Tan is a 49 year old female new to our office today for initiation of medical weight loss program, referred by PCP.  Patient presents today with c/o excess weight.    Reason/goal for weight loss: weight loss and improved fitness    Previous weight loss efforts in the past: WW, [facility] in the past    Past 6 months lifestyle interventions: yes, regular exercise    Reviewed [facility] patient contract. Readiness for Lifestyle change: 5/10, Interest in Medication: 9/10, Bariatric surgery interest: 0/10.    Barriers to weight loss: comfort/reward eating    Wt Readings from Last 6 Encounters:   02/20/25 284 lb (128.8 kg)   01/07/25 287 lb (130.2 kg)   08/16/24 288 lb (130.6 kg)   07/18/24 281 lb (127.5 kg)   02/15/24 250 lb (113.4 kg)   09/28/23 277 lb 3.2 oz (125.7 kg)          Social hx and lifestyle reviewed:    How many meals do you eat out per week: not reported  Who is the primary cook in your home: patient    Please respond to the questions regarding your previous weight loss    How did you hear about the [facility]? Primary Care Provider   Previous weight loss efforts in the past/medication(s): Weight watchers   Eating behaviors/patterns that have been barriers to weight loss success in the past: I like to eat and don’t like to diet…   Please respond to the questions regarding a 24 hour food journal.  Include the average time you ate and the quantity/food preparation method.    List foods, qty and prep for breakfast: This egg and cheese frozen terrence thing i eat most morjings   List foods, qty and prep for lunch. Panda express from my parents   List foods, qty and prep for dinner. Veggie pizza   List foods, qty and prep for snacks. Yogurt parfait with fruit and granola   List the types and qty of fluids consumed Water   Please respond to the questions  regarding lifestyle.    Tobacco use: No   Alcohol use: How many servings per week? 0   Supplements taken on a regular basis include: Multivitamin most days, vitamin D   Please respond to the questions regarding exercise/activity    How many days per week are you active or exercise 1   What type of activities: Walk   Perceived level of exertion on a scale of 1-5, with 5 being very intense: 2   Average stress level on a scale of 1-10, with 10 being extremely stressed: 7   How do you cope with stress: Talk with friends. Fake it. Sleep.   Please respond to the questions regarding sleep    How many hours of uninterrupted sleep do you get a night: 7   How many times do you wake up in the night: 2   Do you feel rested in the morning: No   Do you snore: No   Do you have sleep apnea: Yes   Do you use: CPAP     Work: full time dental hygienist   Marital status:  with 3 children, 2 are in college  Support: yes    MEDICAL HISTORY  PMH reviewed:   Cardiac disorders: HTN, dyslipidemia   Depression/anxiety: negative  Glaucoma: negative  Kidney stones: negative  Eating disorder: negative  Migraines: negative  Seizures: negative  Joint-related conditions: negative  Liver disease: negative  Renal disease: negative  Diabetes: prediabetes  Thyroid disease: hypothyroidism  Constipation: negative  Other pertinent hx: n/a  Sleep Apnea hx: BRADEN on CPAP  Cancer hx: negative  Cholecystectomy and/or gallstones: negative  Family or personal history of Pancreatic issues / Medullary Thyroid Cancer/MENS 2: negative  History of bariatric surgery: negative    FMH reviewed: obesity in parent/s or sibling: yes    REVIEW OF SYSTEMS  GENERAL: feels well otherwise  SKIN: denies any rashes to skin folds  HEENT: snoring- no  LUNGS: denies shortness of breath with exertion  CARDIOVASCULAR: denies chest pain on exertion, denies palpitations or pedal edema  GI: denies abdominal pain.  No N/V/D/C  MUSCULOSKELETAL: denies joint pains  NEURO: denies  headaches  PSYCH: denies change in behavior or mood, denies feeling sad or depressed. BED screen- negative.    EXAM  /80   Pulse 94   Resp 16   Ht 5' 8\" (1.727 m)   Wt 284 lb (128.8 kg)   LMP 12/30/2024 (Exact Date)   BMI 43.18 kg/m² ,   Percent body fat: F >32%: 48% VF: 17. Muscle Mass: 11%, WC: 47 inches.  GENERAL: well developed, well nourished, in no apparent distress, morbidly obese  SKIN: warm, pink, dry without rashes to exposed area  EYES: conjunctiva pink, sclera non icteric, PERRLA  HEENT: atraumatic, normocephalic, O/p: Mallampati score- 2  NECK: supple, non tender, no adenopathy, no thyromegaly  LUNGS: CTA in all fields, breathing non labored  CARDIO: RRR without murmur, normal S1 and S2 without clicks or gallops, no pedal edema. Reviewed EKG in EMR dated 1/10/2018, Echo stress test dated 11/20/17 and CAC score of ) dated 10/21/21   GI: +BS, soft, no masses, HSM or tenderness  MUSCULOSKELETAL: grossly intact  NEURO: Oriented times three  PSYCH: pleasant, cooperative, normal mood and affect    Lab Results   Component Value Date    WBC 4.9 07/11/2024    RBC 4.84 07/11/2024    HGB 13.7 07/11/2024    HCT 42.1 07/11/2024    MCV 87.0 07/11/2024    MCH 28.3 07/11/2024    MCHC 32.5 07/11/2024    RDW 13.6 07/11/2024     07/11/2024    MPV 10.0 11/22/2011     Lab Results   Component Value Date    GLU 93 07/11/2024    BUN 9 07/11/2024    BUNCREA SEE NOTE: 07/11/2024    CREATSERUM 0.81 07/11/2024    ANIONGAP 8 10/05/2020    GFR 97 01/24/2017    GFRNAA 103 04/22/2022    GFRAA 120 04/22/2022    CA 9.2 07/11/2024    OSMOCALC 283 10/05/2020    ALKPHO 67 07/11/2024    AST 17 07/11/2024    ALT 13 07/11/2024    BILT 0.6 07/11/2024    TP 6.8 07/11/2024    ALB 4.2 07/11/2024    GLOBULIN 2.6 07/11/2024    AGRATIO 1.6 07/11/2024     07/11/2024    K 4.4 07/11/2024     07/11/2024    CO2 29 07/11/2024     Lab Results   Component Value Date     (H) 10/05/2020    A1C 6.2 (H) 07/11/2024     Lab  Results   Component Value Date    CHOLEST 248 (H) 07/11/2024    TRIG 84 07/11/2024    HDL 66 07/11/2024     (H) 07/11/2024    VLDL 17 10/05/2020    TCHDLRATIO 3.8 07/11/2024    NONHDLC 182 (H) 07/11/2024     Lab Results   Component Value Date    T4F 1.0 09/25/2017    TSH 2.320 10/05/2020    TSHT4 2.39 07/11/2024     Lab Results   Component Value Date    B12 243 10/05/2020    VITB12 340 07/11/2024     Lab Results   Component Value Date    VITD 25 (L) 07/11/2024       Medications Ordered Prior to Encounter[1]    ASSESSMENT  Initial Weight Data and Goal Weight Loss:  Weight Calculations  Initial Weight: 284 lbs  Initial Weight Date: 02/20/25  Today's Weight: 284 lbs  5% Goal: 14.2  10% Goal: 28.4  Total Weight Loss: 0 lbs    Diagnoses and all orders for this visit:    Encounter for therapeutic drug monitoring  -     OP REFERRAL TO DIETITIAN EMG [facility] ([facility] USE ONLY)  -     Tirzepatide-Weight Management (ZEPBOUND) 5 MG/0.5ML Subcutaneous Solution Auto-injector; Inject 5 mg into the skin once a week. Start after completing full 4 weeks on 2.5 mg weekly dose.  -     Tirzepatide-Weight Management (ZEPBOUND) 2.5 MG/0.5ML Subcutaneous Solution Auto-injector; Inject 2.5 mg into the skin once a week.    Class 3 severe obesity with serious comorbidity and body mass index (BMI) of 40.0 to 44.9 in adult, unspecified obesity type (HCC)  - Start Zepbound as directed  - Reviewed balanced plate nutrition with focus on whole food, regular meals daily that include protein and produce and eliminating/reducing late night eating.  - Counseled on the 4 Pillars of health (sleep, stress, nutrition and fitness).  - Reviewed weight synopsis in EMR with noted 34# weight gain over the last 1 year.  - Instructed to use contraception at all times while on AOMs.  Comments:  Baseline BMI 43.8  Orders:  -     OP REFERRAL TO DIETITIAN EMG [facility] ([facility] USE ONLY)  -     Tirzepatide-Weight Management (ZEPBOUND) 5 MG/0.5ML Subcutaneous Solution  Auto-injector; Inject 5 mg into the skin once a week. Start after completing full 4 weeks on 2.5 mg weekly dose.  -     Tirzepatide-Weight Management (ZEPBOUND) 2.5 MG/0.5ML Subcutaneous Solution Auto-injector; Inject 2.5 mg into the skin once a week.    BRADEN on CPAP  - Benefit of Zepbound for BRADEN treatment  Comments:  AHI 7.5  Orders:  -     OP REFERRAL TO DIETITIAN [facility] ([facility] USE ONLY)  -     Tirzepatide-Weight Management (ZEPBOUND) 5 MG/0.5ML Subcutaneous Solution Auto-injector; Inject 5 mg into the skin once a week. Start after completing full 4 weeks on 2.5 mg weekly dose.  -     Tirzepatide-Weight Management (ZEPBOUND) 2.5 MG/0.5ML Subcutaneous Solution Auto-injector; Inject 2.5 mg into the skin once a week.    Prediabetes  -     OP REFERRAL TO DIETITIAN EMG [facility] ([facility] USE ONLY)  -     Tirzepatide-Weight Management (ZEPBOUND) 5 MG/0.5ML Subcutaneous Solution Auto-injector; Inject 5 mg into the skin once a week. Start after completing full 4 weeks on 2.5 mg weekly dose.  -     Tirzepatide-Weight Management (ZEPBOUND) 2.5 MG/0.5ML Subcutaneous Solution Auto-injector; Inject 2.5 mg into the skin once a week.    Hypertension, unspecified type  -     OP REFERRAL TO DIETITIAN EMG [facility] ([facility] USE ONLY)  -     Tirzepatide-Weight Management (ZEPBOUND) 5 MG/0.5ML Subcutaneous Solution Auto-injector; Inject 5 mg into the skin once a week. Start after completing full 4 weeks on 2.5 mg weekly dose.  -     Tirzepatide-Weight Management (ZEPBOUND) 2.5 MG/0.5ML Subcutaneous Solution Auto-injector; Inject 2.5 mg into the skin once a week.    Dyslipidemia  -     OP REFERRAL TO DIETITIAN EMG [facility] ([facility] USE ONLY)  -     Tirzepatide-Weight Management (ZEPBOUND) 5 MG/0.5ML Subcutaneous Solution Auto-injector; Inject 5 mg into the skin once a week. Start after completing full 4 weeks on 2.5 mg weekly dose.  -     Tirzepatide-Weight Management (ZEPBOUND) 2.5 MG/0.5ML Subcutaneous Solution Auto-injector; Inject 2.5 mg into the skin once a  week.    Acquired hypothyroidism        PLAN  Medication use and side effects reviewed with patient.  Medication contraindications: none foreseen  Follow up with dietitian and psychologist as recommended.  Discussed the role of sleep and stress in weight management.  Reviewed in EMR  Counseled on comprehensive weight loss plan including attention to nutrition, exercise and behavior/stress management for success. See patient instruction below for more details.  Reviewed previous labs in EMR/Care Everywhere  Weight Loss Contract reviewed and signed.    Patient Instructions   Welcome to the [facility]...your Lifestyle Renovation begins now!  Thank you for placing your trust in our health care team, I look forward to working with you along this journey to better health!    Next steps:     1.  Call our office at [phone] to schedule a personal nutrition consultation with one of our registered dieticians, De Ba. Bring along your food journal (3 days minimum). See journal options below.  2.  Body composition completed today with findings of: Total body fat: 48% (goal < 32%), Visceral Fat: 17 (goal <10), Muscle mass: 11% (goal >18%), and waist circumference 47 inches (goal <35 inches).  3.  Use contraception at all times while on anti-obesity medications.  4.  Fill your prescribed medication and take as discussed and prescribed: Start Zepbound at 2.5 mg weekly. After 4 weeks increase to the next dose of 5 mg weekly. If at a weight plateau for >3 weeks, then send Douban message with current scale weight to determine if a dose adjustment is appropriate. Otherwise plan to maintain this dose until next visit. Any further dose titrations beyond this dose will be considered at appointments only, unless otherwise discussed. Visit the website [email or web address] and click on Consumers for additional details, savings, and further dosing instructions. This medication may require a prior  authorization (PA) by your insurance. A PA may take one week plus to complete and our office will be in touch during this process if needed. If cost/supply prohibitive plan: contact office - consider Wegovy if covered.    Zepbound is being prescribed for the dual FDA indication for the treatment of Overweight/Obesity and Sleep Apnea.    Tips while taking an injectable medication:    Be an intuitive eater. Listen to your hunger and fullness signals, stopping when you are full.  Consume protein and produce in your day, striving for a rainbow of color of produce.  Reduce portions to starting size of 1 cup and check in with your gut to see if you are full. Use a sand timer to slow down your eating pace to allow for 15-20 minutes to complete a meal and use the \"2 bite rule\".  Reduce refined sugars and high fat foods, as they may contribute to greater side effects of nausea and heartburn.  Stop eating 3 hours before bedtime to allow your food to digest.  Remain hydrated with water or non caloric and non caffeine beverages.  Use over the counter malu lozenge/supplement to help reduce nausea if needed.  If you have been off your medication for more than 2 weeks please notify our office to determine next dosing, as a return to previous dose may not be appropriate or tolerated.      Please try to work on the following dietary changes this first month:    1.  Drink water with meals and throughout the day, cut down on soda and/or juice if consumed. Consider flavored water options like Bubbly, Spindrift, Hint and Randi. Reduce alcohol servings to 4 per week maximum.  2.  Have protein with each meal, examples include: greek yogurt, cottage cheese, hard boiled egg, tofu, chicken, fish, or tuna.   3.  Work towards reducing/eliminating refined carbohydrates and sugars which includes items such as sweets, as well as rice, pasta, and bread and make sure to choose whole grain options when having them with just 1 serving per meal  about the size of your inner palm.  4.  Consume non starchy veggies daily working towards making them a good 50% of your daily food intake. Add them to lunch and dinner consistently.  5.  Start a daily probiotic: VSL#3 is recommended, (order on line at www.vsl3.com). Take 1 capsule daily with water for 30 days, then reduce to 1 every other day (this will reduce the cost). Capsules can be left out of refrigerator for 2 weeks. I recommend using a pill box weekly and keeping the bottle in the fridge.    Please download kevni My Fitness Pal, LoseIt! Or My Net Diary to monitor daily dietary intake and you will be able to see if you are eating the right amount of calories or too much or too little which would hinder weight loss. Additionally this will help to see your daily carbohydrate and protein intake. When you set the kevin up choose 1.5 lbs/week as a goal.  Keeping a paper food journal is an option as well to remain accountable for your choices- this is the start to mindful eating! A low calorie diet has been consistently shown to support weight loss.    Continue or start exercising to help establish a routine. If not already exercising begin with 1 day/week and progress as able with the goal of working towards 30 minutes 5 days a week at a minimum. A variety or cardio, strength and stretching is important. Review resources below to help support you in building this healthy routine.    Meditation daily can help manage and control stress. Chronic stress can make weight loss difficult.  Exercising is one way to help with stress, but meditation using the CALM Kevin or another comparable alternative can be done in your home or place of work with little time commitment. This Kevin can also help work on behavior change and improve sleep. Check out the segment under Calm Masterclass and listen to The 4 Pillars of Health. A great way to begin learning about the foundation of lifestyle with practical tips to use in your every day.  In addition, we offer counseling services and support for individual connection and care. A referral is necessary so please let me know if this is a service you are interested.    Check out www.yourweightmatters.org blog for continued support and education along your weight loss journey to optimal health!      Patient Resources:    Personal Training/Fitness Classes/Health Coaching    [facility] in [place]: Full fitness center with group fitness and personal training located in [place].  Health Coaching with Neris Sanchez, Jake Cabrera, and Johnson Denney at our [facility]- individual coaching to work on your health goals. Call [phone] and/or email @ [email or web address]. Free 60 minute consult when client of [facility].  [facility] @ [email or web address]. A variety of group fitness options plus various yoga classes [phone] and/or email Dang at [email or web address]  [facility] Fitness Centers with multiple locations: NetSanity (www.L2C), F45 Training (www.d46xgvcpqttInternet Media Labs), Fit Body Bootcamp (www.Personal CapitalbodyboCapzlesp.Bandcamp), Fiducioso Advisors (www.Payveris), The Exercise  (www.exercisecoach.com), Club Pilates (www.clubThrill.Bandcamp)    Online Fitness  Fitness  on AdCamp  Fit in 10 DVD series   www.slzyx07WVE.Bandcamp  Chair exercises via Sit and Be Fit (www.sitandbefit.org) and Gov-Savings (www.Flavours.com) or Perez Bell or Naun Muñoz videos on YouTube.  Hip Hop Fit with Nemesio Tang at www.hiphopfit.net    Apps for on the Go Fitness  [place] 7 Minute Workout (orange box with white 7) - free on the go HIIT training kevin  Rubi Kevin @ www.onepeloton.com    Nutrition Trackers, Meal Preparation, and Other Meal Programs  LoseIT! And My Fitness Pal apps and on line for tracking nutrition  NOOM - virtual health coaching  FitFoundation (healthy meals on the go) in [place] @  www.fxtncabnwjlis6w.com  Florencia HERNANDES @ www.bistromd.Only Natural Pet Store and Lpryiy98 (calorie smart and low carb plans recommended) @ www.fkaszh38.com, Metabolic Meals @ www.MyMetabolicMeals.com - individual prepared meals to go  Gobble, Blue Apron, Home , Every Plate, Sunbasket- on line meal delivery programs for preparation at home  [place] in [place] for homemade meals to go @ www.Tongbanjie  Diet Doctor @ www.dietdoctor.com - low carb swaps  ReciMe and Mealime reagan (grocery and meal planning)    Stress, Anxiety, Depression, Trauma  CALM meditation reagan (www.calm.com)  Headspace  Don't let anxiety run your life. Using the science of emotion regulation and mindfulness to overcome fear and worry by Angelito Castro PsyD and Sher Medina MA.  The SiTune Podcast (September 27, 2023): 6 Magic Words That Stop Anxiety  What Happened to You?- a look at the impact trauma has on behavior written by Moisés Moya and Dr. Anant Basurto  Whole Again by Judah Clemente - discovering your true self after trauma    Mindful Eating/The Hungry Brain  Am I Hungry? Mindful eating virtual  reagan (www.amihungry.com)  The Hungry Brain by Joaquina Amaya, PhD  Mindless Eating by Hamzah Fenton  Weight Loss Surgery Will Not Treat Food Addiction by Lisa Tripathi Ph.D    Metabolic Dysfunction, Hormones and Cravings  Why We Get Sick? By Evangelista Torres (insulin resistance)  Your Body in Balance: The New Science of Food, Hormones, and Health by Dr. Francisco Javier Mclaughlin  The Complete Guide to fasting by Dr. Albert  Fast Like a Girl by Dr. Angela Barnes  The M Factor (documentary on PBS about Menopause)  Sugar, Salt & Fat by Amber Palacio, Ph.D, R.D.  The Truth About Sugar - documentary on sugar (Free on Dash Hudson, https://youtu.be/8C6tuqgQW0b)  Presentation on SUGAR called Sugar: The Bitter Truth by Dr. Antonio Matos (Dash Hudson) https://youtu.be/dBnniua6-oM?si=[email or web address]  Reverse Visceral Fat: #1 Way to Increase Your Lifespan & End Inflammation with  Dr. Arash Larios on Utube @ https://youtu.be/nupPRnvUpJY?si=ci6bobRgZVG9UnrS    Nutrition Support  You Are What You Eat - Netfix series on twin study looking at impact of nutrition changes on health  The End of Dieting: How to Live for Life by Dr. Teto Márquez M.D. or listen to The JackRabbit Systems Podcast Episode 63: Understanding \"Nutritarian\" Eating w/Dr. Teto Márquez  The Game Changers- Netflix Documentary on plant based nutrition  The Dr. Dominguez T5 Wellness Plan by Dr. Bharat Dominguez MD  The Complete Guide to fasting by Dr. Albert  @[facility] ([facility] Dietician with support surrounding nutrition and meal prep/planning)    Education, Motivation and Support Resources  Live to 100: Secrets of the Blue Zones - Netflix series on the secrets to communities living over 100 years old  Atomic Habits by Hebert Guzman (a book about taking small steps to promote greater behavior change)   Motivation regaan (black box with white \")- daily supportive messages sent to your phone  Can't Hurt Me by Angelito Celyaa (a book exploring the power of discipline in achieving your goals)  Fed Up - documentary about obesity (Free on Utube)  Www.yourweightmatters.org - Obesity Action Coalition sponsored Blog posts  Obesity Action Coalition Resources on topics specific to weight management (www.obesityaction.org)  Fitlosophy Fitspiration - journal to better health (journal book found at [employer] in fitness aisle)  Harshad Allen talk titled: The Call to Courage (Netflix)  The Exam Room by the Physician's Committee (Podcast)  Nutrition Facts by Dr. Bernal (Podcast)      Balanced Nutrition includes:     Build the mentality of Food 4 Fuel. Clean eating with whole foods and eliminating/reducing ultra processed foods.  Be an intuitive eater and using mindful eating practices.  Eat a balanced plate with protein and produce at all meals: 1/4 plate- protein, 1/2 plate non starchy veggies, and 1/4 plate fruit or complex carbohydrate.  Drink water with all  meals and use a salad plate to naturally reduce portions.  Eliminate/reduce late night eating by stopping after 7pm. Allowing your body to fast for 12 hours (drink only water, tea or black coffee without any additives).            Re-thinking Nutrition: Learning to See Food as Fuel  October 8, 2019  Posted in Blog, Nutrition  By Your Weight Matters Campaign    “Dieting” can start to feel challenging when we begin to associate healthy behaviors with “punishment.” Too often, we overlook the real reason we eat food. It's not only meant to be enjoyed, but also to provide basic fuel for our bodies.  Learning to see food as fuel can help you find balance in your journey with weight. It will teach you about the primal purpose of food, the effect certain foods have on health, and how to listen carefully to what your body is trying to tell you.  It Starts with Cells  Did you know your body has more than 35 trillion cells? Each one serves a purpose.  Once a cell has completed its purpose, it dies. Your body replaces dead and worn-out cells with new and energetic ones. It also depends on this ongoing cell cycle to keep you healthy. And guess what? The foods you eat help shape this process.  Seeing Food as Fuel  Eating the right foods helps build and repair cells to be stronger than before. It does this by getting nutrition from whole grains, vitamins, minerals, fats, protein and other nutrients.  These essential nutrients matter greatly to every single cell in your body. They make up your:  Cell membrane  Nucleus  Mitochondria  When cells join together, they make tissue that brings life to your bones, brain, skin, nerves, muscles, etc. The health and lifespan of your cells depend on the nutrients you get from food. That is why healthy food choices are so important.   Once you can start to see food as being fuel for your body, you will get better at making the healthy choice the easy choice. Food will be less about rewards or  punishments and more about supporting your overall health and happiness.  Building Blocks of Good Nutrition  A diet without enough fiber, protein and healthy fats can cause your cells to become brittle, leaky and tired. When cells can't do what they are designed to do, problems like inflammation, cancer and other difficult health conditions start to arise.  Foods that Support Healthy Cells:  Unsaturated Fats - Fish, nuts avocados, olive oil, flax seed, etc.  Protein - Poultry, lean beef, yogurt, eggs, seeds, beans, etc.  Antioxidants - Fruits, dark green veggies, sweet potatoes, tea, etc.  So, the next time you grab something to eat, ask yourself how that food helps or hurts your body. This is a part of living mindful and being knowledgeable about what you consume regularly.  When you start to see food as fuel, not just a tasty treat or the solution to a bad temper, you will start to make healthier choices more often. Making new habits is one of the building blocks to successful long-term weight management!      Perimenopause/Menopause and Obesity    The prevalence of obesity, which is closely associated with cardiovascular risk, increases significantly in [place] women after they reach age 40; the prevalence reaches 65% between 40 and 59 years, and 73.8% in women over age 60.    According to the Healthy Women Study, the average weight gain in perimenopausal women was about five pounds; however, 20% of the population they studied gained 10 pounds or more. Not only is the weight increase from a drop in estrogen but it’s also because of a decrease in energy expenditure. Some women may notice an overall weight gain, whereas others may not see a difference on the scale but may notice that their pants aren’t buttoning as easily. Both are surprising to many women because they may not notice a difference in their dietary intake or activity.    The reasons for increasing obesity in menopausal women are not clear. Some  researchers argue that the absence of estrogens may be an important obesity-triggering factor. Estrogen deficiency enhances metabolic dysfunction predisposing to type 2 diabetes mellitus, the metabolic syndrome, and cardiovascular diseases. Estrogen plays a vital role in fat storage and distribution. Before perimenopause, estrogen deposits fat in your thighs, hips, and buttocks. During and after menopause, the drop in estrogen leads to an overall increase in total body fat but now more so in your midsection. Studies have consistently shown that this waistline increase is different from when you were younger. An increase in visceral (abdominal) fat is linked to an increase in insulin resistance, diabetes, and inflammatory diseases.    Another factor contributing to weight gain in perimenopause may be the increased appetite and calorie intake that occurs in response to hormone changes. In one study, levels of the “hunger hormone” ghrelin were found to be significantly higher in perimenopausal women compared with premenopausal and postmenopausal women.    The low estrogen levels in the late stages of menopause may also impair the function of leptin and neuropeptide Y, hormones that control fullness and appetite. Therefore, women in the late stages of perimenopause who have low estrogen levels may be driven to eat more calories and store fat.    Make weight gain a modifiable risk factor  So, you may be thinking--I’m destined for failure! But this isn’t true. Although the risk of weight gain as a middle-aged woman is higher, this does not mean that it is required. It does mean that we may have to work a little harder to prevent this from happening. It is important to keep in mind that many of the health risks found in the menopause transition are also affected by weight. If we are able to keep a healthy weight, or at least minimize any weight gain, then we are likely to minimize these additional health risks. Now that  you know the risks, here are some ways to stay healthy during this midlife transition and avoid a midlife crisis:    Reduce calories. During menopause, our energy expenditure decreases even if our activity level and nutrient intake stays the same. This is secondary to the hormone changes with menopause as well as the natural muscle loss that is occurring. We need about 200 fewer calories in our 50s than we did in our 30s and 40s. This means that we’ve got to move more and eat less to keep our healthy weight. To help decrease portion sizes, try splitting your meals with a friend, ordering the lighter portion when available, or put half in the takeout box right away. Swap out dessert for fruit or yogurt.   Reduce carbs. Cut back on carbs in order to reduce the increase in belly fat, which drives metabolic problems   Add fiber. Eat a high-fiber diet that includes flaxseeds, which may improve insulin sensitivity.   Work out. Engage in strength training to improve body composition, increase strength, and build and maintain lean muscle. The American Heart Association recommends 150 minutes of moderate exercise per week. Add ANY activity to your day. Strength and resistance training help maintain bone mass. This will help to prevent osteoporosis, which is bone loss that can lead to easy fractures.   Rest and relax. Try to relax before bed and get enough sleep in order to keep your hormones and appetite under control.   Get support and learn to cope without food. Many women (and men) admit to eating under stress. And, let’s face it, middle age can bring some tough times. Children are often departing from the home, and some are returning. Your parents now need more help and guidance. This can be disruptive to our everyday lives. Focus on using nonfood stress relievers. Try going for a walk, deep breathing, or scheduling some “me” time with your favorite book to unwind. Seek out support from friends and loved ones who may  have gone through a similar situation.    Taken from The [employer]  Oma Cox MD, [facility], [facility]      What are proteins?  Proteins are one of three primary macronutrients that provide energy to the human body, along with fats and carbohydrates. Proteins are also responsible for a large portion of the work that is done in cells; they are necessary for proper structure and function of tissues and organs, and also act to regulate them. They are comprised of a number of amino acids that are essential to proper body function, and serve as the building blocks of body tissue.  There are 20 different amino acids in total, and the sequence of amino acids determines a protein's structure and function. While some amino acids can be synthesized in the body, there are 9 amino acids that humans can only obtain from dietary sources (insufficient amounts of which may sometimes result in death), termed essential amino acids. Foods that provide all of the essential amino acids are called complete protein sources, and include both animal (meat, dairy, eggs, fish) as well as plant-based sources (soy, quinoa, buckwheat).    Proteins can be categorized based on the function they provide to the body. Below is a list of some types of proteins:  Antibody--proteins that protect the body from foreign particles, such as viruses and bacteria, by binding to them  Enzyme--proteins that help form new molecules as well as perform the many chemical reactions that occur throughout the body  Messenger--proteins that transmit signals throughout the body to maintain body processes  Structural component--proteins that act as building blocks for cells that ultimately allow the body to move  Transport/storage--proteins that move molecules throughout the body  As can be seen, proteins have many important roles throughout the body, and as such, it is important to provide sufficient nutrition to the body to maintain healthy  protein levels.    How much protein do I need?  The amount of protein that the human body requires daily is dependent on many conditions, including overall energy intake, growth of the individual, and physical activity level. It is often estimated based on body weight, as a percentage of total caloric intake (10-35%), or based on age alone. 0.8g/kg of body weight is a commonly cited recommended dietary allowance (RDA). This value is the minimum recommended value to maintain basic nutritional requirements, but consuming more protein, up to a certain point, maybe beneficial, depending on the sources of the protein.  The recommended range of protein intake is between 0.8 g/kg and 1.8 g/kg of body weight, dependent on the many factors listed above. People who are highly active, or who wish to build more muscle should generally consume more protein. Some sources suggest consuming between 1.8 to 2 g/kg for those who are highly active. The amount of protein a person should consume, to date, is not an exact science, and each individual should consult a specialist, be it a dietitian, doctor, or , to help determine their individual needs. I recommend your daily protein intake to be 100 grams/day.    Foods high in protein  There are many different combinations of food that a person can eat to meet their protein intake requirements. For many people, a large portion of protein intake comes from meat and dairy, though it is possible to get enough protein while meeting certain dietary restrictions you might have. Generally, it is easier to meet your RDA of protein by consuming meat and dairy, but an excess of either can have a negative health impact. There are plenty of plant-based protein options, but they generally contain less protein in a given serving. Ideally, a person should consume a mixture of meat, dairy, and plant-based foods in order to meet their RDA and have a balanced diet replete with  nutrients.    If possible, consuming a variety of complete proteins is recommended. A complete protein is a protein that contains a good amount of each of the nine essential amino acids required in the human diet. Examples of complete protein foods or meals include:    Meat/Dairy examples  Eggs  Chicken breast  Cottage cheese  Greek yogurt  Milk  Lean beef  Tuna  Turkey breast  Fish  Shrimp    Vegan/plant-based examples  Buckwheat  Hummus and terrence  Soy products (tofu, tempeh, edamame beans)  Peanut butter on toast or some other bread  Beans and rice  Quinoa  Hemp and sal seeds  Spirulina  Generally, meat, poultry, fish, eggs, and dairy products are complete protein sources. Nuts and seeds, legumes, grains, and vegetables, among other things, are usually incomplete proteins. There is nothing wrong with incomplete proteins however, and there are many healthy, high protein foods that are incomplete proteins. As long as you consume a sufficient variety of incomplete proteins to get all the required amino acids, it is not necessary to specifically eat complete protein foods. In fact, certain high fat red meats for example, a common source of complete proteins, can be unhealthy.   Below are some examples of high protein foods that are not complete proteins:  Almonds  Oats   Broccoli  Lentils  Marty bread  Sal seeds  Pumpkin seeds  Peanuts  Canadian sprouts  Grapefruit  Green peas  Avocados  Mushrooms  As can be seen, there are many different foods a person can consume to meet their RDA of protein. The examples provided above do not constitute an exhaustive list of high protein or complete protein foods. As with everything else, balance is important, and the examples provided above are an attempt at providing a list of healthier protein options (when consumed in moderation).    Amount of protein in common food      Protein Amount  Milk (1 cup/8 oz)  8 g  Egg (1 large/50 g)  6 g  Meat (1 slice / 2 oz)  14 g  Seafood (2  oz)  16 g  Bread (1 slice/64 g)   8 g  Corn (1 cup/166 g)  16 g  Rice (1 cup/195 g)  5 g  Dry Bean (1 cup/92 g)   16 g  Nuts (1 cup/92 g)    20 g  Fruits and Veggie (1 cup)  1 g    Data above taken from www.calculator.net    The Power of Protein:    High Protein Foods:  FISH  (3-6 ounces/meal)  All types of fish  Seafood (shrimp, scallops, clams, mussels, lobster)  EGGS     2-3 eggs/meal  DAIRY (2/3 to 1 ½ cup)  Cottage cheese   Greek yogurt  PLANTS (½-3/4 cup/meal)  Legumes: Dried beans and peas (black beans, berkowitz beans, garbanzo beans, kidney, cannellini, navy, split peas, black eyed peas)  Lentils  Quinoa  Soy (edamame, tofu)  PORK   (3-6 oz/meal)  Tenderloin  Pork chop  Top loin roast, boneless  Sirloin roast, boneless  Mason edwards (nitrate free)  Boiled deli ham (nitrate free)    Additional Protein Sources:  BEEF    (3-6 oz/meal)  Flank steak       Skirt steak  Bottom round(rump roast), select    Ground beef, 90% lean (ground sirloin)  Phil eye steak, choice  Eye of round roast, choice   POULTRY  (3-6 oz/meal)  Ground chicken or turkey  Chicken, no skin  Turkey, no skin  PROTEIN SHAKES: OWYN, Koia, and Rebbl (plant based and dairy free), Corepower by VibeSecLuis (plant based option available), Premier Protein, JuicePlus Complete (www.juiceplus.com)  PROTEIN BARS: RXBAR, PowerCrunch, Quest, Barebells, Mosh  SNACK/ON-the-GO OPTIONS: Chomps Meatstick, Oats Overnight ([email or web address]), York brand protein granola      Return for weight management via clinic or Telemedicine Visit and in clinic in Sept/October.    Patient verbalizes understanding.    NGUYỄN Orona  2/20/2025    DOCUMENTATION OF TIME SPENT: Code selection for this visit was based on time spent : 60 minutes on date of service in preparing to see the patient, obtaining and/or reviewing separately obtained history, performing a medically appropriate examination, counseling and educating the patient/family/caregiver, ordering  medications or testing, referring and communicating with other healthcare providers, documenting clinical information in the electronic medical record, independently interpreting results and communicating results to the patient/family/caregiver and care coordination with the patient's other providers.         [1]   Current Outpatient Medications on File Prior to Visit   Medication Sig Dispense Refill    losartan 50 MG Oral Tab Take 1 tablet (50 mg total) by mouth daily. 90 tablet 2    levothyroxine 50 MCG Oral Tab Take 1 tablet (50 mcg total) by mouth before breakfast. 90 tablet 3     No current facility-administered medications on file prior to visit.

## 2025-02-21 ENCOUNTER — TELEPHONE (OUTPATIENT)
Dept: INTERNAL MEDICINE CLINIC | Facility: CLINIC | Age: 50
End: 2025-02-21

## 2025-03-25 ENCOUNTER — PATIENT MESSAGE (OUTPATIENT)
Dept: INTERNAL MEDICINE CLINIC | Facility: CLINIC | Age: 50
End: 2025-03-25

## 2025-04-28 ENCOUNTER — PATIENT MESSAGE (OUTPATIENT)
Dept: FAMILY MEDICINE CLINIC | Facility: CLINIC | Age: 50
End: 2025-04-28

## 2025-04-29 NOTE — TELEPHONE ENCOUNTER
LOV 8/16/2024 for bump and hypertension    Will advise patient to schedule BP follow up appointment.  Until then, did you want patient to do anything? Continue to monitor? Hydrate?    Please advise.

## 2025-04-30 RX ORDER — LOSARTAN POTASSIUM 25 MG/1
25 TABLET ORAL DAILY
Qty: 30 TABLET | Refills: 2 | Status: SHIPPED | OUTPATIENT
Start: 2025-04-30

## 2025-04-30 NOTE — TELEPHONE ENCOUNTER
Reduce losartan to 25mg per day, see me for bp check in 2-4 weeks. Continue to monitor bp at home.

## 2025-06-02 ENCOUNTER — HOSPITAL ENCOUNTER (EMERGENCY)
Facility: HOSPITAL | Age: 50
Discharge: HOME | End: 2025-06-02
Attending: EMERGENCY MEDICINE
Payer: COMMERCIAL

## 2025-06-02 ENCOUNTER — APPOINTMENT (OUTPATIENT)
Dept: GENERAL RADIOLOGY | Facility: HOSPITAL | Age: 50
End: 2025-06-02
Attending: EMERGENCY MEDICINE
Payer: COMMERCIAL

## 2025-06-02 VITALS
HEART RATE: 93 BPM | HEIGHT: 68 IN | BODY MASS INDEX: 40.92 KG/M2 | RESPIRATION RATE: 20 BRPM | OXYGEN SATURATION: 100 % | WEIGHT: 270 LBS | DIASTOLIC BLOOD PRESSURE: 105 MMHG | SYSTOLIC BLOOD PRESSURE: 147 MMHG | TEMPERATURE: 98 F

## 2025-06-02 DIAGNOSIS — R55 SYNCOPE, NEAR: Primary | ICD-10-CM

## 2025-06-02 DIAGNOSIS — R00.2 PALPITATIONS: ICD-10-CM

## 2025-06-02 LAB
ALBUMIN SERPL-MCNC: 4.8 G/DL (ref 3.2–4.8)
ALBUMIN/GLOB SERPL: 2 {RATIO} (ref 1–2)
ALP LIVER SERPL-CCNC: 56 U/L (ref 39–100)
ALT SERPL-CCNC: 10 U/L (ref 10–49)
ANION GAP SERPL CALC-SCNC: 10 MMOL/L (ref 0–18)
AST SERPL-CCNC: 18 U/L (ref ?–34)
BASOPHILS # BLD AUTO: 0.02 X10(3) UL (ref 0–0.2)
BASOPHILS NFR BLD AUTO: 0.3 %
BILIRUB SERPL-MCNC: 0.4 MG/DL (ref 0.3–1.2)
BUN BLD-MCNC: 12 MG/DL (ref 9–23)
CALCIUM BLD-MCNC: 9.5 MG/DL (ref 8.7–10.6)
CHLORIDE SERPL-SCNC: 103 MMOL/L (ref 98–112)
CO2 SERPL-SCNC: 26 MMOL/L (ref 21–32)
CREAT BLD-MCNC: 0.98 MG/DL (ref 0.55–1.02)
EGFRCR SERPLBLD CKD-EPI 2021: 70 ML/MIN/1.73M2 (ref 60–?)
EOSINOPHIL # BLD AUTO: 0.05 X10(3) UL (ref 0–0.7)
EOSINOPHIL NFR BLD AUTO: 0.7 %
ERYTHROCYTE [DISTWIDTH] IN BLOOD BY AUTOMATED COUNT: 14.1 %
GLOBULIN PLAS-MCNC: 2.4 G/DL (ref 2–3.5)
GLUCOSE BLD-MCNC: 100 MG/DL (ref 70–99)
GLUCOSE BLD-MCNC: 93 MG/DL (ref 70–99)
HCT VFR BLD AUTO: 39.3 % (ref 35–48)
HGB BLD-MCNC: 12.9 G/DL (ref 12–16)
IMM GRANULOCYTES # BLD AUTO: 0.02 X10(3) UL (ref 0–1)
IMM GRANULOCYTES NFR BLD: 0.3 %
LYMPHOCYTES # BLD AUTO: 1.21 X10(3) UL (ref 1–4)
LYMPHOCYTES NFR BLD AUTO: 16.4 %
MCH RBC QN AUTO: 27.9 PG (ref 26–34)
MCHC RBC AUTO-ENTMCNC: 32.8 G/DL (ref 31–37)
MCV RBC AUTO: 84.9 FL (ref 80–100)
MONOCYTES # BLD AUTO: 0.46 X10(3) UL (ref 0.1–1)
MONOCYTES NFR BLD AUTO: 6.2 %
NEUTROPHILS # BLD AUTO: 5.61 X10 (3) UL (ref 1.5–7.7)
NEUTROPHILS # BLD AUTO: 5.61 X10(3) UL (ref 1.5–7.7)
NEUTROPHILS NFR BLD AUTO: 76.1 %
OSMOLALITY SERPL CALC.SUM OF ELEC: 288 MOSM/KG (ref 275–295)
PLATELET # BLD AUTO: 308 10(3)UL (ref 150–450)
POTASSIUM SERPL-SCNC: 3.6 MMOL/L (ref 3.5–5.1)
PROT SERPL-MCNC: 7.2 G/DL (ref 5.7–8.2)
RBC # BLD AUTO: 4.63 X10(6)UL (ref 3.8–5.3)
SODIUM SERPL-SCNC: 139 MMOL/L (ref 136–145)
TROPONIN I SERPL HS-MCNC: <3 NG/L (ref ?–34)
WBC # BLD AUTO: 7.4 X10(3) UL (ref 4–11)

## 2025-06-02 PROCEDURE — 36415 COLL VENOUS BLD VENIPUNCTURE: CPT

## 2025-06-02 PROCEDURE — 84484 ASSAY OF TROPONIN QUANT: CPT | Performed by: EMERGENCY MEDICINE

## 2025-06-02 PROCEDURE — 71045 X-RAY EXAM CHEST 1 VIEW: CPT | Performed by: EMERGENCY MEDICINE

## 2025-06-02 PROCEDURE — 93010 ELECTROCARDIOGRAM REPORT: CPT

## 2025-06-02 PROCEDURE — 82962 GLUCOSE BLOOD TEST: CPT

## 2025-06-02 PROCEDURE — 99285 EMERGENCY DEPT VISIT HI MDM: CPT

## 2025-06-02 PROCEDURE — 85025 COMPLETE CBC W/AUTO DIFF WBC: CPT | Performed by: EMERGENCY MEDICINE

## 2025-06-02 PROCEDURE — 99284 EMERGENCY DEPT VISIT MOD MDM: CPT

## 2025-06-02 PROCEDURE — 93005 ELECTROCARDIOGRAM TRACING: CPT

## 2025-06-02 PROCEDURE — 80053 COMPREHEN METABOLIC PANEL: CPT | Performed by: EMERGENCY MEDICINE

## 2025-06-02 RX ORDER — METOPROLOL TARTRATE 25 MG/1
25 TABLET, FILM COATED ORAL 2 TIMES DAILY PRN
Qty: 15 TABLET | Refills: 0 | Status: SHIPPED | OUTPATIENT
Start: 2025-06-02 | End: 2025-06-17

## 2025-06-02 NOTE — ED INITIAL ASSESSMENT (HPI)
Pt states while at work she felt a wave of dizziness.  Pt states lastest for 10 sec.  Pt felt her heart palpating.  HR was 170 per Pt. Pt denies chest pain.

## 2025-06-03 LAB
ATRIAL RATE: 112 BPM
P AXIS: 61 DEGREES
P-R INTERVAL: 132 MS
Q-T INTERVAL: 322 MS
QRS DURATION: 80 MS
QTC CALCULATION (BEZET): 439 MS
R AXIS: 65 DEGREES
T AXIS: 36 DEGREES
VENTRICULAR RATE: 112 BPM

## 2025-06-10 ENCOUNTER — PATIENT MESSAGE (OUTPATIENT)
Dept: INTERNAL MEDICINE CLINIC | Facility: CLINIC | Age: 50
End: 2025-06-10

## 2025-06-10 DIAGNOSIS — E66.813 CLASS 3 SEVERE OBESITY WITH SERIOUS COMORBIDITY AND BODY MASS INDEX (BMI) OF 40.0 TO 44.9 IN ADULT, UNSPECIFIED OBESITY TYPE: Primary | ICD-10-CM

## 2025-06-10 DIAGNOSIS — R73.03 PREDIABETES: ICD-10-CM

## 2025-06-10 DIAGNOSIS — I10 HYPERTENSION, UNSPECIFIED TYPE: ICD-10-CM

## 2025-06-10 DIAGNOSIS — G47.33 OSA ON CPAP: ICD-10-CM

## 2025-06-10 DIAGNOSIS — E78.5 DYSLIPIDEMIA: ICD-10-CM

## 2025-06-12 NOTE — TELEPHONE ENCOUNTER
Requesting zepbound increase EVERARDO  LOV: 2/20/25  RTC: Sept or October   Last Relevant Labs: na  Filled: 4/5/25 #2 ml with 2 refills 5 mg dose    Future Appointments   Date Time Provider Department Center   7/1/2025  1:40 PM Mine Hair APRN [facility]   10/9/2025 11:00 AM Mine Hair APRN [facility]     Has lost 10 pounds since starting

## 2025-06-13 RX ORDER — TIRZEPATIDE 7.5 MG/.5ML
7.5 INJECTION, SOLUTION SUBCUTANEOUS WEEKLY
Qty: 2 ML | Refills: 1 | Status: SHIPPED | OUTPATIENT
Start: 2025-06-13

## 2025-07-01 ENCOUNTER — TELEMEDICINE (OUTPATIENT)
Dept: INTERNAL MEDICINE CLINIC | Facility: CLINIC | Age: 50
End: 2025-07-01
Payer: COMMERCIAL

## 2025-07-01 DIAGNOSIS — R73.03 PREDIABETES: ICD-10-CM

## 2025-07-01 DIAGNOSIS — I10 HYPERTENSION, UNSPECIFIED TYPE: ICD-10-CM

## 2025-07-01 DIAGNOSIS — Z51.81 ENCOUNTER FOR THERAPEUTIC DRUG MONITORING: Primary | ICD-10-CM

## 2025-07-01 DIAGNOSIS — E78.5 DYSLIPIDEMIA: ICD-10-CM

## 2025-07-01 DIAGNOSIS — G47.33 OSA ON CPAP: ICD-10-CM

## 2025-07-01 DIAGNOSIS — E66.813 CLASS 3 SEVERE OBESITY WITH SERIOUS COMORBIDITY AND BODY MASS INDEX (BMI) OF 40.0 TO 44.9 IN ADULT, UNSPECIFIED OBESITY TYPE: ICD-10-CM

## 2025-07-01 PROCEDURE — 98006 SYNCH AUDIO-VIDEO EST MOD 30: CPT | Performed by: NURSE PRACTITIONER

## 2025-07-01 NOTE — PATIENT INSTRUCTIONS
Continue making lifestyle changes that focus on good nutrition, regular exercise and stress management.    Medication Plan: Continue Zepbound 7.5 mg weekly and send Zephyr message with any need for dosage adjustment if a weight plateau is noted.    Tips while taking an injectable medication:    Be an intuitive eater. Listen to your hunger and fullness signals, stopping when you are full.  Consume protein and produce in your day, striving for a rainbow of color of produce.  Reduce portions to staring size of 1 cup size and check in with your gut to see if you are full. Set the timer to slow down your eating pace to allow for 15-20 minutes to complete a meal. Recommend following the \"2 bite rule\".  Reduce refined sugars and high fat foods, as they may contribute to greater side effects of nausea and heartburn.  Stop eating 3 hours before bedtime to allow your food to digest.  Remain hydrated with water or non caloric and non caffeine beverages.  Use over the counter malu lozenge/supplement to help reduce nausea if needed.  If you have been off your medication for more than 2 weeks please notify our office to determine next dosing, as return to previous dose may not be appropriate or tolerated.  Zepbound can be kept at room temperature for up to 3 weeks.      Next steps to work on before next visit include: Keep up the great work! Summer travel and eating out tips listed below. Reviewed walking on the home treadmill as a step towards regular exercise.    Staying Healthy While Traveling    by Jazzy Multani RD    OAC at www.obesityaction.org Summer 2023 Resource    It’s time to go on a vacation! Summertime is a popular season for travel, and vacations are a wonderful way to unwind, discover new places, and spend time with family and friends. Taking a break from your daily routine is really important. When you return, you’ll feel refreshed and ready for the next things you need to do! As you pack your bags and plan  your trip, remember to stay focused on your health and wellness goals. With a bit of planning, it’s not hard to stay active, eat well, and have a great time during your vacation.    Do Your Research and Plan Ahead  Keeping yourself on track during a vacation might feel like an impossible challenge. However, with the right preparation, it’s entirely achievable. Before you leave, take some time to plan your vacation. Look for ways to include health and nutrition in your itinerary, such as trying new activities and foods. You’ll be pleasantly surprised to find that many places have great options for staying healthy.    Check your Nutrition  Instead of constantly eating sweets, fried foods and junk, seek out healthier alternatives. Choosing nutritious foods while traveling has many advantages. First, eating healthy will give you more energy, which is important for having a fun vacation. Second, sticking to your plan will give you a sense of accomplishment and satisfaction. Here are some tips to help you choose wisely and prioritize your well-being.    Consider using grocery delivery services. You can shop online from home and have meals and snacks delivered to your hotel. Just make sure your room has a refrigerator. This way, you won’t have to search for a store and can avoid the temptation of unhealthy vacation treats. You can stock up on fresh produce, cheese sticks, popcorn and protein bars to keep in your hotel room.    Make eating out fun by finding new restaurants to try at your destination. Take a moment to look up their menus online and come up with a plan. You can plan to try fresh fish at a local beach restaurant, visit a steakhouse and choose a lean cut of meat with a side salad, or enjoy chicken fajitas at a Mexican restaurant.  Remember to give yourself a break. It’s okay to indulge in a special meal or snack during your vacation. One treat doesn’t mean the whole day is ruined. Just get back on track with  your healthy eating at the next meal.    Finding Fitness  Discovering fun fitness activities can help you build a habit that you’ll want to continue. When you’re on vacation, make it a point to set aside time to move your body. This will not only increase your energy levels but also make you feel positive and satisfied about being active. Be creative and think outside the box to come up with exciting ideas to stay active during your vacation!    Try to find ways to move throughout the day. Look for trails in a local park, a gym at your hotel, or join an exercise class for the day. Keep things interesting by varying your activities and trying something new.  Involve the whole family in staying active. Rent bikes for an afternoon, plan a walk after dinner, or try something different like surfing! You can also let your kids choose an activity. You might be surprised to hear what ideas they have!    Mind Your Mental Health  Vacations are meant to be relaxing and a chance to recharge. Make sure you have a vacation that helps you rest and rejuvenate! Sometimes it’s easy to plan too many activities and take on too many commitments. Take a moment to find balance between fun and fitness.    Pack items you enjoy. Bring your favorite books, puzzles, music, or whatever you love to ensure you have time to do just that. Sneaking away for a few minutes can give you time to spend on your favorite things.  Remember to rest. Vacations can be full of exciting things, but it’s also important to take it easy. Take an afternoon nap or find some quiet time alone to recharge.    Include activities that you enjoy. Vacations offer a lot of things to do, so make sure you have activities that you personally enjoy along with the rest of the family.    How to: Make it Through a Long Day of Travel  Whether you’re taking a road trip for a few hours or a plane across the [place] travel days require some planning. Here are some tips to make your trip  easier.    Pack a snack bag for long road trips or airport days. When in the car, consider bringing a cooler with water to avoid sugary snacks at the gas stations. Pack fresh fruits, vegetables, water, cheese and meats. Include snacks like popcorn, pretzels or whole-grain crackers. Airline travel can be more challenging, but you can pack snacks such as protein bars, trail mix or popcorn in your carry-on. Bring a refillable water bottle to stay hydrated throughout the day!    Find ways to walk, even on your busiest travel day. If you’re driving, take quick 10-15-minute walks every couple of hours. It will not only give you some cardio exercise but also reduce stress and improve your mood during long car rides. If you’re stuck in an airport, walk up and down the terminal while you wait.  Bring items like books, adult coloring books or knitting supplies to keep busy and reduce screen time. This can be a great opportunity to explore a new hobby and let your creative juices flow.  How to: Make Healthy Food Choices When Eating Out  Making healthy food choices while eating out on vacation can be challenging.    Instead of donuts and pastries for breakfast, go for high-protein items like eggs, turkey sausage, turkey edwards, oats and whole-grain toast. Ask for fresh fruit or low-fat yogurt on the side to make breakfast even more nutritious.  Restaurant portions can be large. If someone in your group is willing to split a meal, that can be an easy solution. Since you might not have enough space to take leftovers with you, you may have to leave some behind.  When looking at the lunch or dinner menu, choose grilled, baked or boiled options. This can save you a lot of calories and fat. Be mindful of side dishes, as they can add up quickly. Consider swapping a high-calorie side item for a side salad, fruit or vegetables.    Desserts can be tempting, but plan ahead if you want to order one. Choose a lower-calorie meal to balance it  out. You can also share the dessert with others at your table to enjoy a smaller portion.  How to: Deal With an unexpected change of plans  We’ve all been there. Your perfectly planned vacation goes sideways. How do you make the most of these days? It can be easy to throw in the towel, but always be ready with a backup plan.    The weather can change unexpectedly. A rainy day might ruin your beach or mountain hike plans. Take on the challenge and find new ways to have fun. Look for an indoor pool or an activity center to keep working towards your goals!    Your carefully chosen restaurant may not have healthy options, or your grocery store delivery might be late. Don’t give up; just adapt and make a new decision. Make the best choice you can and move on to the next meal. It’s okay if it’s not perfect!    Sometimes your perfectly planned day doesn’t go as expected. Kids may start fighting during a trip to the park, or someone might get sick on vacation. Focus on the positive moments and enjoy the time you have.  Wherever you go on your summer vacation, make sure to enjoy the break, relaxation and adventure. Taking time away from your usual routine can be amazing, and keeping up with your health and fitness goals can make your vacation even better.      Healthy Tips for Eating Out  Choices  It’s not easy to change the habits of a lifetime. Experts say that it can take 6 months just to change one old habit for a healthier one. That’s why gradual change is so important. These tips are reminders of the dozens of small ways you can change your habits when eating out. Don’t expect to follow each tip all the time. Think of the tips as options for handling situations that may have given you trouble in the past.  You don't have to give up eating out to cut down on fat, cholesterol, and salt. You just need to think about what you order. Many menus highlight low-fat and low-sodium dishes. But if you can't find what you want,  ask. Explain what you need to the  or . Or ask to see printed nutrition information.    Ask for what you want  Ask that foods be prepared with little or no fat and with no added salt.  Ask that sauces be left off or served on the side. Choose sauces made with tomato instead of with cream or cheese.  Ask for steamed rice or a baked or boiled potato, without butter or sour cream.  Ask that vegetables be steamed and served with no butter or sauce. Ask for lemon juice or vinegar to sprinkle on them for flavor.  Keep these tips in mind  Choose minestrone or vegetable soups. Ask about sodium content.  Order salad dressing on the side. Dip your fork in the dressing, then in the salad.  Look for fish, chicken, turkey, or meat that is broiled, roasted, poached, or steamed.  Order 1 or 2 low-fat appetizers or soup and a salad instead of a main dish. Or eat only half of the main dish and take the rest home.  If you want a dessert, try fresh fruit, nonfat yogurt, or sorbet. Or share a dessert.  Fast food tips  Choose grilled chicken sandwiches and plain hamburgers. Add vegetables to your burgers and sandwiches and go lightly on creamy sauces like mayonnaise and tartar sauce. Choose whole-grain buns or bread when available.  Remove the skin from fried chicken and choose mashed potatoes, corn on the cob, and baked beans on the side.  Order a broiled chicken salad and pile on fresh vegetables from the salad bar. Use a small amount of low-fat dressing.  Top a baked potato with cottage cheese and vegetables from the salad bar.  Ask for a pizza topped with vegetables.  Watch portion size. Order regular size meals, not super-sized. A kid's meal may be enough and may have healthier options for sides.  Stay away from milkshakes, sugary sodas, and sweetened drinks. Instead choose low-fat or skim milk, water, unsweetened ice tea, and sparkling water.  Restaurant tips  Plan ahead. Decide what you will eat before you get to  the restaurant. If you plan to eat high-fat foods, choose low-fat foods during the rest of the day.  Don’t be afraid to ask how foods are prepared and whether they can be done without high-fat ingredients, such as cream, butter, cheese, and oil.  Ask for sauces and salad dressings on the side and use just a tablespoon. Ask for low-fat dressings.  Try starting your meal with a bowl of vegetable soup or a salad. This may help to prevent you from overeating during the meal.  Order meat, poultry, and fish broiled, grilled, baked, poached, or steamed. Always remove the skin from chicken.  Choose Mexican dishes made with soft, rather than crispy tortillas. For toppings, use salsa and lettuce, rather than sour cream and cheese.  For dessert, enjoy fruit, sorbet, or low-fat frozen yogurt. Share a rich dessert with friends.  Make a meal out of a low-fat appetizer (such as shrimp cocktail or fresh pasta), bread, and salad as your main meal. Ask for an appetizer-size portion of an entree.    Foods to avoid  Donuts, muffins, and pastries  Coconut, vegetables with butter, cream, or cheese sauce  Cream, whole milk, and powdered creamers  Bowling, liver, luncheon meats, ground meat, and canned fish in oil  Sweets and foods made with butter, coconut or palm oil, or hydrogenated fats    © 5080-4473 [employer]. [address] [place] [address]. All rights reserved. This information is not intended as a substitute for professional medical care. Always follow your healthcare professional's instructions.      A Sample Walking Program  Experts recommend walking briskly on most days. Aim for a target of 30 minutes on most days, or 150 or more minutes a week. Walking programs can help you reach this goal by slowly increasing the frequency and the amount of  time you walk. Try this walking program:  First week  Walk 3 times a week.  Walk for 5 minutes each time.  Second week  Walk 3 times a week.  Walk for 10 minutes  each time.  Third week  Walk 3 times a week.  Walk for 13 minutes each time.  Fourth week  Walk 3 times a week.  Walk for 15 minutes each time.  Fifth week  Walk 4 times a week.  Walk for 15 minutes each time.  Sixth week and beyond  Gradually increase your minutes of walking each time, and your number of times each week, until you reach 30 minutes, 5-7 days of the week.  Tips for getting the most from your walking program  Walk briskly. If you can sing, speed up. If you can’t talk easily, slow down.  Choose good walking shoes with padded soles and good arch support.  Don’t use hand or ankle weights. They can cause injuries.  Walk indoors if the weather is bad. Use a treadmill or walk inside a shopping mall  Before you start walking, check with your healthcare provider if you are new to exercise, over 40, overweight, or a smoker. Also check with your provider  if you have heart disease, high blood pressure, diabetes, arthritis, asthma, or any other health problem that concerns you. Your provider can help you get started and help you stay safe.   Date Last Reviewed: 8/13/2015  © 1954-8811 [employer]. [address] [place] [address], [place] [address]. All rights reserved. This information is not intended as a substitute for professional medical care. Always follow your healthcare professional's instructions.

## 2025-07-01 NOTE — PROGRESS NOTES
[facility] Weight Management follow up via video visit:    Subjective    This visit is conducted using Telemedicine with live, interactive video and audio.    Chief Complaint:  Routine follow up visit for lifestyle and medical management for overweight, obesity, or morbid obesity. LOV in clinic weight: 284#.    PMH reviewed. Bariatric surgery planned or hx of surgery in the past: no.    HPI:   Ene Tan is a 49 year old female who is being followed up today for lifestyle and medical management as deemed appropriate for overweight, obesity or morbid obesity. Patient reports weight loss monitoring at home via scale with a weight of 274#. This appears to be a weight loss since LOV 4.5 months ago in clinic as NP consult. Patient has been consistent with medication and tolerating recent dose increase of Zepbound now at 7.5 mg weekly.    Questions/Concerns/Comments since LOV: Has not seen the dietician. Had recent near syncope, palpitation episode and seen by cardiology. Reviewed labs and progress note in EMR. No further f/u needed per patient report. BP elevated in ER but came down to 120/78 at time of cardiology f/u on 6/10/25. Continues to struggle with comfort foods and adding exercise. Does not enjoy sweating.    Lifestyle/Social Hx Reviewed: NO lifestyle log completed    ROS  General: feeling well, denies fatigue  EYES: denies vision changes or high pain/pressure.  CV: denies cp, palpitations. See above  Resp: denies sob  GI: denies abdominal pain. Denies N/V/D/C.  Neuro: denies paresthesia or cognitive changes  Psych: denies any mood changes    Physical Exam:  >   BP Readings from Last 3 Encounters:   06/02/25 (!) 147/105   02/20/25 138/80   01/07/25 130/80       Home weight: see above  Home BP: not available  Home blood sugars: n/a  Today's calculated BMI from reported weight: 41.7    General: patient speaking in full sentences, no increased work of breathing. alert, appears stated age, cooperative, no  distress, and morbidly obese  HENT: normocephalic  Resp: Breathing is non labored  Psych: patient appears cheerful, smiling, making good eye contact    Diagnoses and all orders for this visit:    Encounter for therapeutic drug monitoring    Class 3 severe obesity with serious comorbidity and body mass index (BMI) of 40.0 to 44.9 in adult, unspecified obesity type    BRADEN on CPAP    Prediabetes    Hypertension, unspecified type    Dyslipidemia        Patient Instructions   Continue making lifestyle changes that focus on good nutrition, regular exercise and stress management.    Medication Plan: Continue Zepbound 7.5 mg weekly and send [employer] message with any need for dosage adjustment if a weight plateau is noted.    Tips while taking an injectable medication:    Be an intuitive eater. Listen to your hunger and fullness signals, stopping when you are full.  Consume protein and produce in your day, striving for a rainbow of color of produce.  Reduce portions to staring size of 1 cup size and check in with your gut to see if you are full. Set the timer to slow down your eating pace to allow for 15-20 minutes to complete a meal. Recommend following the \"2 bite rule\".  Reduce refined sugars and high fat foods, as they may contribute to greater side effects of nausea and heartburn.  Stop eating 3 hours before bedtime to allow your food to digest.  Remain hydrated with water or non caloric and non caffeine beverages.  Use over the counter malu lozenge/supplement to help reduce nausea if needed.  If you have been off your medication for more than 2 weeks please notify our office to determine next dosing, as return to previous dose may not be appropriate or tolerated.  Zepbound can be kept at room temperature for up to 3 weeks.      Next steps to work on before next visit include: Keep up the great work! Summer travel and eating out tips listed below. Reviewed walking on the home treadmill as a step towards regular  exercise.    Staying Healthy While Traveling    by Jazzy Multani RD    OAC at [email or web address] Summer 2023 Resource    It’s time to go on a vacation! Summertime is a popular season for travel, and vacations are a wonderful way to unwind, discover new places, and spend time with family and friends. Taking a break from your daily routine is really important. When you return, you’ll feel refreshed and ready for the next things you need to do! As you pack your bags and plan your trip, remember to stay focused on your health and wellness goals. With a bit of planning, it’s not hard to stay active, eat well, and have a great time during your vacation.    Do Your Research and Plan Ahead  Keeping yourself on track during a vacation might feel like an impossible challenge. However, with the right preparation, it’s entirely achievable. Before you leave, take some time to plan your vacation. Look for ways to include health and nutrition in your itinerary, such as trying new activities and foods. You’ll be pleasantly surprised to find that many places have great options for staying healthy.    Check your Nutrition  Instead of constantly eating sweets, fried foods and junk, seek out healthier alternatives. Choosing nutritious foods while traveling has many advantages. First, eating healthy will give you more energy, which is important for having a fun vacation. Second, sticking to your plan will give you a sense of accomplishment and satisfaction. Here are some tips to help you choose wisely and prioritize your well-being.    Consider using grocery delivery services. You can shop online from home and have meals and snacks delivered to your hotel. Just make sure your room has a refrigerator. This way, you won’t have to search for a store and can avoid the temptation of unhealthy vacation treats. You can stock up on fresh produce, cheese sticks, popcorn and protein bars to keep in your hotel room.    Make eating out fun by  finding new restaurants to try at your destination. Take a moment to look up their menus online and come up with a plan. You can plan to try fresh fish at a local beach restaurant, visit a steakhouse and choose a lean cut of meat with a side salad, or enjoy chicken fajitas at a Mexican restaurant.  Remember to give yourself a break. It’s okay to indulge in a special meal or snack during your vacation. One treat doesn’t mean the whole day is ruined. Just get back on track with your healthy eating at the next meal.    Finding Fitness  Discovering fun fitness activities can help you build a habit that you’ll want to continue. When you’re on vacation, make it a point to set aside time to move your body. This will not only increase your energy levels but also make you feel positive and satisfied about being active. Be creative and think outside the box to come up with exciting ideas to stay active during your vacation!    Try to find ways to move throughout the day. Look for trails in a local park, a gym at your hotel, or join an exercise class for the day. Keep things interesting by varying your activities and trying something new.  Involve the whole family in staying active. Rent bikes for an afternoon, plan a walk after dinner, or try something different like surfing! You can also let your kids choose an activity. You might be surprised to hear what ideas they have!    Mind Your Mental Health  Vacations are meant to be relaxing and a chance to recharge. Make sure you have a vacation that helps you rest and rejuvenate! Sometimes it’s easy to plan too many activities and take on too many commitments. Take a moment to find balance between fun and fitness.    Pack items you enjoy. Bring your favorite books, puzzles, music, or whatever you love to ensure you have time to do just that. Sneaking away for a few minutes can give you time to spend on your favorite things.  Remember to rest. Vacations can be full of exciting  things, but it’s also important to take it easy. Take an afternoon nap or find some quiet time alone to recharge.    Include activities that you enjoy. Vacations offer a lot of things to do, so make sure you have activities that you personally enjoy along with the rest of the family.    How to: Make it Through a Long Day of Travel  Whether you’re taking a road trip for a few hours or a plane across the [place] travel days require some planning. Here are some tips to make your trip easier.    Pack a snack bag for long road trips or airport days. When in the car, consider bringing a cooler with water to avoid sugary snacks at the gas stations. Pack fresh fruits, vegetables, water, cheese and meats. Include snacks like popcorn, pretzels or whole-grain crackers. Airline travel can be more challenging, but you can pack snacks such as protein bars, trail mix or popcorn in your carry-on. Bring a refillable water bottle to stay hydrated throughout the day!    Find ways to walk, even on your busiest travel day. If you’re driving, take quick 10-15-minute walks every couple of hours. It will not only give you some cardio exercise but also reduce stress and improve your mood during long car rides. If you’re stuck in an airport, walk up and down the terminal while you wait.  Bring items like books, adult coloring books or knitting supplies to keep busy and reduce screen time. This can be a great opportunity to explore a new hobby and let your creative juices flow.  How to: Make Healthy Food Choices When Eating Out  Making healthy food choices while eating out on vacation can be challenging.    Instead of donuts and pastries for breakfast, go for high-protein items like eggs, turkey sausage, turkey edwards, oats and whole-grain toast. Ask for fresh fruit or low-fat yogurt on the side to make breakfast even more nutritious.  Restaurant portions can be large. If someone in your group is willing to split a meal, that can be an easy  solution. Since you might not have enough space to take leftovers with you, you may have to leave some behind.  When looking at the lunch or dinner menu, choose grilled, baked or boiled options. This can save you a lot of calories and fat. Be mindful of side dishes, as they can add up quickly. Consider swapping a high-calorie side item for a side salad, fruit or vegetables.    Desserts can be tempting, but plan ahead if you want to order one. Choose a lower-calorie meal to balance it out. You can also share the dessert with others at your table to enjoy a smaller portion.  How to: Deal With an unexpected change of plans  We’ve all been there. Your perfectly planned vacation goes sideways. How do you make the most of these days? It can be easy to throw in the towel, but always be ready with a backup plan.    The weather can change unexpectedly. A rainy day might ruin your beach or mountain hike plans. Take on the challenge and find new ways to have fun. Look for an indoor pool or an activity center to keep working towards your goals!    Your carefully chosen restaurant may not have healthy options, or your grocery store delivery might be late. Don’t give up; just adapt and make a new decision. Make the best choice you can and move on to the next meal. It’s okay if it’s not perfect!    Sometimes your perfectly planned day doesn’t go as expected. Kids may start fighting during a trip to the park, or someone might get sick on vacation. Focus on the positive moments and enjoy the time you have.  Wherever you go on your summer vacation, make sure to enjoy the break, relaxation and adventure. Taking time away from your usual routine can be amazing, and keeping up with your health and fitness goals can make your vacation even better.      Healthy Tips for Eating Out  Choices  It’s not easy to change the habits of a lifetime. Experts say that it can take 6 months just to change one old habit for a healthier one. That’s why  gradual change is so important. These tips are reminders of the dozens of small ways you can change your habits when eating out. Don’t expect to follow each tip all the time. Think of the tips as options for handling situations that may have given you trouble in the past.  You don't have to give up eating out to cut down on fat, cholesterol, and salt. You just need to think about what you order. Many menus highlight low-fat and low-sodium dishes. But if you can't find what you want, ask. Explain what you need to the  or . Or ask to see printed nutrition information.    Ask for what you want  Ask that foods be prepared with little or no fat and with no added salt.  Ask that sauces be left off or served on the side. Choose sauces made with tomato instead of with cream or cheese.  Ask for steamed rice or a baked or boiled potato, without butter or sour cream.  Ask that vegetables be steamed and served with no butter or sauce. Ask for lemon juice or vinegar to sprinkle on them for flavor.  Keep these tips in mind  Choose minestrone or vegetable soups. Ask about sodium content.  Order salad dressing on the side. Dip your fork in the dressing, then in the salad.  Look for fish, chicken, turkey, or meat that is broiled, roasted, poached, or steamed.  Order 1 or 2 low-fat appetizers or soup and a salad instead of a main dish. Or eat only half of the main dish and take the rest home.  If you want a dessert, try fresh fruit, nonfat yogurt, or sorbet. Or share a dessert.  Fast food tips  Choose grilled chicken sandwiches and plain hamburgers. Add vegetables to your burgers and sandwiches and go lightly on creamy sauces like mayonnaise and tartar sauce. Choose whole-grain buns or bread when available.  Remove the skin from fried chicken and choose mashed potatoes, corn on the cob, and baked beans on the side.  Order a broiled chicken salad and pile on fresh vegetables from the salad bar. Use a small amount of  low-fat dressing.  Top a baked potato with cottage cheese and vegetables from the salad bar.  Ask for a pizza topped with vegetables.  Watch portion size. Order regular size meals, not super-sized. A kid's meal may be enough and may have healthier options for sides.  Stay away from milkshakes, sugary sodas, and sweetened drinks. Instead choose low-fat or skim milk, water, unsweetened ice tea, and sparkling water.  Restaurant tips  Plan ahead. Decide what you will eat before you get to the restaurant. If you plan to eat high-fat foods, choose low-fat foods during the rest of the day.  Don’t be afraid to ask how foods are prepared and whether they can be done without high-fat ingredients, such as cream, butter, cheese, and oil.  Ask for sauces and salad dressings on the side and use just a tablespoon. Ask for low-fat dressings.  Try starting your meal with a bowl of vegetable soup or a salad. This may help to prevent you from overeating during the meal.  Order meat, poultry, and fish broiled, grilled, baked, poached, or steamed. Always remove the skin from chicken.  Choose Mexican dishes made with soft, rather than crispy tortillas. For toppings, use salsa and lettuce, rather than sour cream and cheese.  For dessert, enjoy fruit, sorbet, or low-fat frozen yogurt. Share a rich dessert with friends.  Make a meal out of a low-fat appetizer (such as shrimp cocktail or fresh pasta), bread, and salad as your main meal. Ask for an appetizer-size portion of an entree.    Foods to avoid  Donuts, muffins, and pastries  Coconut, vegetables with butter, cream, or cheese sauce  Cream, whole milk, and powdered creamers  Bowling, liver, luncheon meats, ground meat, and canned fish in oil  Sweets and foods made with butter, coconut or palm oil, or hydrogenated fats    © 3675-1982 [employer]. [address] [place] [address], [place] [address]. All rights reserved. This information is not intended as a substitute for professional  medical care. Always follow your healthcare professional's instructions.      A Sample Walking Program  Experts recommend walking briskly on most days. Aim for a target of 30 minutes on most days, or 150 or more minutes a week. Walking programs can help you reach this goal by slowly increasing the frequency and the amount of  time you walk. Try this walking program:  First week  Walk 3 times a week.  Walk for 5 minutes each time.  Second week  Walk 3 times a week.  Walk for 10 minutes each time.  Third week  Walk 3 times a week.  Walk for 13 minutes each time.  Fourth week  Walk 3 times a week.  Walk for 15 minutes each time.  Fifth week  Walk 4 times a week.  Walk for 15 minutes each time.  Sixth week and beyond  Gradually increase your minutes of walking each time, and your number of times each week, until you reach 30 minutes, 5-7 days of the week.  Tips for getting the most from your walking program  Walk briskly. If you can sing, speed up. If you can’t talk easily, slow down.  Choose good walking shoes with padded soles and good arch support.  Don’t use hand or ankle weights. They can cause injuries.  Walk indoors if the weather is bad. Use a treadmill or walk inside a shopping mall  Before you start walking, check with your healthcare provider if you are new to exercise, over 40, overweight, or a smoker. Also check with your provider  if you have heart disease, high blood pressure, diabetes, arthritis, asthma, or any other health problem that concerns you. Your provider can help you get started and help you stay safe.   Date Last Reviewed: 8/13/2015  © 0999-8670 [employer]. [address] [place] [address], [place] [address]. All rights reserved. This information is not intended as a substitute for professional medical care. Always follow your healthcare professional's instructions.        Return in about 3 months (around 10/1/2025) for as scheduled.    DOCUMENTATION OF TIME SPENT: Code selection for this  visit was based on time spent : 30 minutes on date of service in preparing to see the patient, obtaining and/or reviewing separately obtained history, performing a medically appropriate examination, counseling and educating the patient/family/caregiver, ordering medications or testing, referring and communicating with other healthcare providers, documenting clinical information in the electronic medical record, independently interpreting results and communicating results to the patient/family/caregiver and care coordination with the patient's other providers.

## 2025-07-13 DIAGNOSIS — E03.9 ACQUIRED HYPOTHYROIDISM: ICD-10-CM

## 2025-07-21 RX ORDER — LEVOTHYROXINE SODIUM 50 UG/1
50 TABLET ORAL
Qty: 90 TABLET | Refills: 0 | Status: SHIPPED | OUTPATIENT
Start: 2025-07-21 | End: 2026-07-16

## 2025-07-21 NOTE — TELEPHONE ENCOUNTER
Requesting Levothyroxine 50mcg  LOV: 8/16/24  RTC: 1 week  Last Relevant Labs: 7/11/24  Filled: 7/18/24 #90 with 3 refills    Future Appointments   Date Time Provider Department Center   10/9/2025 11:00 AM Mine Hair APRN [facility]     Thyroid Medication Protocol [id number]/21/2025 01:23 PM   Protocol Details   TSH in past 12 months    Medication is active on med list    Last TSH value is normal    In person appointment or virtual visit in the past 12 mos or appointment in next 3 mos     Refill sent but patient needs to schedule an appointment, it's been almost a year since her last visit.

## 2025-08-15 DIAGNOSIS — R73.03 PREDIABETES: ICD-10-CM

## 2025-08-15 DIAGNOSIS — E78.5 DYSLIPIDEMIA: ICD-10-CM

## 2025-08-15 DIAGNOSIS — E66.813 CLASS 3 SEVERE OBESITY WITH SERIOUS COMORBIDITY AND BODY MASS INDEX (BMI) OF 40.0 TO 44.9 IN ADULT, UNSPECIFIED OBESITY TYPE: ICD-10-CM

## 2025-08-15 DIAGNOSIS — I10 HYPERTENSION, UNSPECIFIED TYPE: ICD-10-CM

## 2025-08-15 DIAGNOSIS — G47.33 OSA ON CPAP: ICD-10-CM

## 2025-08-15 RX ORDER — TIRZEPATIDE 10 MG/.5ML
10 INJECTION, SOLUTION SUBCUTANEOUS WEEKLY
Qty: 2 ML | Refills: 1 | Status: SHIPPED | OUTPATIENT
Start: 2025-08-15

## 2025-08-15 RX ORDER — TIRZEPATIDE 7.5 MG/.5ML
INJECTION, SOLUTION SUBCUTANEOUS
Qty: 2 ML | Refills: 1 | OUTPATIENT
Start: 2025-08-15

## (undated) DIAGNOSIS — E03.9 ACQUIRED HYPOTHYROIDISM: ICD-10-CM

## (undated) NOTE — Clinical Note
Thank you for referring Yumi Ventura to the [facility]. I met with her in consultation today. I have ordered labs, set up a nutrition consultation with our dietician.   She was started on Saxenda for medication therapy and will follow-up with me i

## (undated) NOTE — Clinical Note
Thank you for referring Ene to the [facility]. Consult was completed today via clinic.  I have referred for a nutrition consultation with our dietician.  I counseled on the importance of lifestyle intervention in adjunct with medication and started Zepbound for treatment with follow-up advised in about 4 months.

## (undated) NOTE — LETTER
Ene Tan, :10/14/1975    CONSENT FOR PROCEDURE/SEDATION    1. I authorize the performance upon Raya Bautista  the following: IUD removal    2.  I authorize Dr. Fannie Rey DO (and whomever is designated as the doctor’s assistant), to perform the Witness: _________________________________________ Date:___________     Physician Signature: _______________________________ Date:___________

## (undated) NOTE — LETTER
03/13/19        Laura Bright  [address] [place] [id number]      Dear Diana Stafford,    Our records indicate that you have outstanding lab work and or testing that was ordered for you and has not yet been completed:  Orders Placed This Encounter      TSH

## (undated) NOTE — MR AVS SNAPSHOT
After Visit Summary   6/18/2020    Rodena Rinne    MRN: [id number]           Visit Information     Date & Time  6/18/2020  1:30 PM Provider  Angelito Neves,  Department  [address]  Dept.  Phone  [phone] experience and are looking for ways to make improvements. Your feedback will help us do so. For more information on [employer], please visit [email or web address]                   DO YOU KNOW WHERE TO GO?   Injury & Illness are neve For more information about hours, locations or appointment options available at [facility],   visit [email or web address] or call [phone] [phone]. ([phone])

## (undated) NOTE — MR AVS SNAPSHOT
[facility] [address]  [facility] [address] [phone]               Thank you for choosing us for your health care visit with [facility] 20 NURSE.   We are glad to serve you and happy to provide you with this Visit [facility] online at  [email or web address]

## (undated) NOTE — MR AVS SNAPSHOT
[facility] [address]  [facility] [address] [phone]               Thank you for choosing us for your health care visit with EM[facility] 20 NURSE.   We are glad to serve you and happy to provide you with this cyanocobalamin (VITAMIN B12) 1000 MCG/ML injection 1,000 mcg                    MyChart     Visit MyChart  You can access your MyChart to more actively manage your health care and view more details from this visit by going to [email or web address]. motivated   Don’t forget strength training with weights and resistance Set goals and track your progress   You don’t need to join a gym. Home exercises work great.  Put more priority on exercise in your life                    Visit [facility]

## (undated) NOTE — MR AVS SNAPSHOT
[facility] [address]  [facility] [address] [phone]               Thank you for choosing us for your health care visit with EMG 20 NURSE.   We are glad to serve you and happy to provide you with this

## (undated) NOTE — Clinical Note
Patient was seen for their 1 month f/u at [facility] with a total weight loss of -1# since initial consult.